# Patient Record
Sex: FEMALE | Employment: FULL TIME | URBAN - NONMETROPOLITAN AREA
[De-identification: names, ages, dates, MRNs, and addresses within clinical notes are randomized per-mention and may not be internally consistent; named-entity substitution may affect disease eponyms.]

---

## 2017-09-30 ENCOUNTER — HOSPITAL ENCOUNTER (EMERGENCY)
Age: 37
Discharge: HOME | End: 2017-10-01
Attending: EMERGENCY MEDICINE
Payer: COMMERCIAL

## 2017-09-30 ENCOUNTER — APPOINTMENT (OUTPATIENT)
Dept: GENERAL RADIOLOGY | Age: 37
End: 2017-09-30
Payer: COMMERCIAL

## 2017-09-30 ENCOUNTER — APPOINTMENT (OUTPATIENT)
Dept: CT IMAGING | Age: 37
End: 2017-09-30
Payer: COMMERCIAL

## 2017-09-30 VITALS
SYSTOLIC BLOOD PRESSURE: 131 MMHG | HEART RATE: 58 BPM | BODY MASS INDEX: 35.82 KG/M2 | WEIGHT: 215 LBS | DIASTOLIC BLOOD PRESSURE: 77 MMHG | HEIGHT: 65 IN | OXYGEN SATURATION: 93 % | RESPIRATION RATE: 12 BRPM | TEMPERATURE: 97.8 F

## 2017-09-30 DIAGNOSIS — G43.001 MIGRAINE WITHOUT AURA AND WITH STATUS MIGRAINOSUS, NOT INTRACTABLE: Primary | ICD-10-CM

## 2017-09-30 LAB
ABSOLUTE EOS #: 0.1 K/UL (ref 0–0.4)
ABSOLUTE LYMPH #: 3 K/UL (ref 1–4.8)
ABSOLUTE MONO #: 0.5 K/UL (ref 0–1)
ANION GAP SERPL CALCULATED.3IONS-SCNC: 14 MMOL/L (ref 9–17)
BASOPHILS # BLD: 1 %
BASOPHILS ABSOLUTE: 0 K/UL (ref 0–0.2)
BNP INTERPRETATION: NORMAL
BUN BLDV-MCNC: 10 MG/DL (ref 6–20)
BUN/CREAT BLD: 12 (ref 9–20)
CALCIUM SERPL-MCNC: 8.9 MG/DL (ref 8.6–10.4)
CHLORIDE BLD-SCNC: 101 MMOL/L (ref 98–107)
CO2: 23 MMOL/L (ref 20–31)
CREAT SERPL-MCNC: 0.82 MG/DL (ref 0.5–0.9)
D-DIMER QUANTITATIVE: 0.85 MG/L FEU (ref 0.19–0.5)
DIFFERENTIAL TYPE: NORMAL
EOSINOPHILS RELATIVE PERCENT: 1 %
GFR AFRICAN AMERICAN: >60 ML/MIN
GFR NON-AFRICAN AMERICAN: >60 ML/MIN
GFR SERPL CREATININE-BSD FRML MDRD: ABNORMAL ML/MIN/{1.73_M2}
GFR SERPL CREATININE-BSD FRML MDRD: ABNORMAL ML/MIN/{1.73_M2}
GLUCOSE BLD-MCNC: 100 MG/DL (ref 70–99)
HCT VFR BLD CALC: 44.7 % (ref 36–46)
HEMOGLOBIN: 14.6 G/DL (ref 12–16)
INR BLD: 1 (ref 0.9–1.2)
LYMPHOCYTES # BLD: 32 %
MCH RBC QN AUTO: 28.4 PG (ref 26–34)
MCHC RBC AUTO-ENTMCNC: 32.7 G/DL (ref 31–37)
MCV RBC AUTO: 86.7 FL (ref 80–100)
MONOCYTES # BLD: 6 %
PARTIAL THROMBOPLASTIN TIME: 27.8 SEC (ref 23.2–34.4)
PDW BLD-RTO: 14.1 % (ref 12.1–15.2)
PLATELET # BLD: 214 K/UL (ref 140–450)
PLATELET ESTIMATE: NORMAL
PMV BLD AUTO: 9.5 FL (ref 6–12)
POTASSIUM SERPL-SCNC: 3.5 MMOL/L (ref 3.7–5.3)
PRO-BNP: 294 PG/ML
PROTHROMBIN TIME: 10.3 SEC (ref 9.7–12.2)
RBC # BLD: 5.16 M/UL (ref 4–5.2)
RBC # BLD: NORMAL 10*6/UL
SEG NEUTROPHILS: 60 %
SEGMENTED NEUTROPHILS ABSOLUTE COUNT: 5.5 K/UL (ref 1.8–7.7)
SODIUM BLD-SCNC: 138 MMOL/L (ref 135–144)
TROPONIN INTERP: NORMAL
TROPONIN T: <0.03 NG/ML
WBC # BLD: 9.1 K/UL (ref 3.5–11)
WBC # BLD: NORMAL 10*3/UL

## 2017-09-30 PROCEDURE — 36415 COLL VENOUS BLD VENIPUNCTURE: CPT

## 2017-09-30 PROCEDURE — 93005 ELECTROCARDIOGRAM TRACING: CPT

## 2017-09-30 PROCEDURE — 6360000002 HC RX W HCPCS: Performed by: EMERGENCY MEDICINE

## 2017-09-30 PROCEDURE — 85025 COMPLETE CBC W/AUTO DIFF WBC: CPT

## 2017-09-30 PROCEDURE — 99285 EMERGENCY DEPT VISIT HI MDM: CPT

## 2017-09-30 PROCEDURE — 85379 FIBRIN DEGRADATION QUANT: CPT

## 2017-09-30 PROCEDURE — 85730 THROMBOPLASTIN TIME PARTIAL: CPT

## 2017-09-30 PROCEDURE — 83880 ASSAY OF NATRIURETIC PEPTIDE: CPT

## 2017-09-30 PROCEDURE — 84484 ASSAY OF TROPONIN QUANT: CPT

## 2017-09-30 PROCEDURE — 71010 XR CHEST PORTABLE: CPT

## 2017-09-30 PROCEDURE — 85610 PROTHROMBIN TIME: CPT

## 2017-09-30 PROCEDURE — 96375 TX/PRO/DX INJ NEW DRUG ADDON: CPT

## 2017-09-30 PROCEDURE — 96374 THER/PROPH/DIAG INJ IV PUSH: CPT

## 2017-09-30 PROCEDURE — 71275 CT ANGIOGRAPHY CHEST: CPT

## 2017-09-30 PROCEDURE — 80048 BASIC METABOLIC PNL TOTAL CA: CPT

## 2017-09-30 RX ORDER — ONDANSETRON 2 MG/ML
4 INJECTION INTRAMUSCULAR; INTRAVENOUS ONCE
Status: COMPLETED | OUTPATIENT
Start: 2017-09-30 | End: 2017-09-30

## 2017-09-30 RX ADMIN — ONDANSETRON 4 MG: 2 INJECTION INTRAMUSCULAR; INTRAVENOUS at 23:07

## 2017-09-30 RX ADMIN — HYDROMORPHONE HYDROCHLORIDE 1 MG: 1 INJECTION, SOLUTION INTRAMUSCULAR; INTRAVENOUS; SUBCUTANEOUS at 23:06

## 2017-09-30 ASSESSMENT — PAIN DESCRIPTION - LOCATION
LOCATION: HEAD
LOCATION_2: CHEST

## 2017-09-30 ASSESSMENT — PAIN SCALES - GENERAL: PAINLEVEL_OUTOF10: 8

## 2017-09-30 ASSESSMENT — PAIN DESCRIPTION - INTENSITY: RATING_2: 9

## 2017-09-30 ASSESSMENT — PAIN DESCRIPTION - DESCRIPTORS: DESCRIPTORS_2: ACHING

## 2017-09-30 ASSESSMENT — PAIN DESCRIPTION - PAIN TYPE: TYPE: ACUTE PAIN

## 2017-09-30 NOTE — ED AVS SNAPSHOT
After Visit Summary  (Discharge Instructions)    Medication List for Home    Based on the information you provided to us as well as any changes during this visit, the following is your updated medication list.  Compare this with your prescription bottles at home. If you have any questions or concerns, contact your primary care physician's office. Daily Medication List (This medication list can be shared with any Healthcare provider who is helping you manage your medications)      ASK your doctor about these medications if you have questions     aspirin 81 MG chewable tablet   Take 81 mg by mouth daily       ibuprofen 200 MG tablet   Commonly known as:  ADVIL;MOTRIN   Take 600 mg by mouth every 6 hours as needed. metoprolol succinate 25 MG extended release tablet   Commonly known as:  TOPROL XL   Take 25 mg by mouth daily               Allergies as of 10/1/2017     No Known Allergies      Immunizations as of 10/1/2017     No immunizations on file. After Visit Summary    This summary was created for you. Thank you for entrusting your care to us. The following information includes details about your hospital/visit stay along with steps you should take to help with your recovery once you leave the hospital.  In this packet, you will find information about the topics listed below:    · Instructions about your medications including a list of your home medications  · A summary of your hospital visit  · Follow-up appointments once you have left the hospital  · Your care plan at home      You may receive a survey regarding the care you received during your stay. Your input is valuable to us. We encourage you to complete and return your survey in the envelope provided. We hope you will choose us in the future for your healthcare needs.           Patient Information     Patient Name TRENTON Liz 1980      Care Provided at:     Name Address Phone [address]  [place] [phone]            Your Visit    Here you will find information about your visit, including the reason for your visit. Please take this sheet with you when you visit your doctor or other health care provider in the future. It will help determine the best possible medical care for you at that time. If you have any questions once you leave the hospital, please call the department phone number listed below. Diagnoses this visit     Your diagnosis was MIGRAINE WITHOUT AURA AND WITH STATUS MIGRAINOSUS, NOT INTRACTABLE. Visit Information     Date of Visit Department Dept Phone    9/30/2017 [facility] [phone]      You were seen by     You were seen by Christine Loja DO. Follow-up Appointments    Below is a list of your follow-up and future appointments. This may not be a complete list as you may have made appointments directly with providers that we are not aware of or your providers may have made some for you. Please call your providers to confirm appointments. It is important to keep your appointments. Please bring your current insurance card, photo ID, co-pay, and all medication bottles to your appointment. If self-pay, payment is expected at the time of service. Follow-up Information     Follow up with ALEXANDER CALVILLO CNP. Specialty:  Nurse Practitioner    Why:  As needed    Contact information:    [address]  [phone]        Preventive Care        Date Due    HIV screening is recommended for all people regardless of risk factors  aged 15-65 years at least once (lifetime) who have never been HIV tested.  11/12/1994    Tetanus Combination Vaccine (1 - Tdap) 11/12/1998    Pneumococcal Vaccine - Pneumovax for adults aged 19-64 years with: chronic heart disease, chronic lung disease, diabetes mellitus, alcoholism, chronic liver disease, or cigarette smoking. (1 of 1 - PPSV23) 11/12/1998 Certain functionality such as prescription refills, scheduling appointments or sending messages to your provider are not activated if your provider does not use [facility] in his/her office    For questions regarding your MyChart account call [phone]. If you have a clinical question, please call your doctor's office. The information on all pages of the After Visit Summary has been reviewed with me, the patient and/or responsible adult, by my health care provider(s). I had the opportunity to ask questions regarding this information. I understand I should dispose of my armband safely at home to protect my health information. A complete copy of the After Visit Summary has been given to me, the patient and/or responsible adult. Patient Signature/Responsible Adult: ___________________________________    Nurse Signature: ___________________________________  Resident/MLP Signature: ___________________________________  Attending Signature: ___________________________________    Date:____________Time:____________              Discharge Instructions       Rest at home tonight. Follow-up as needed with family physician.

## 2017-10-01 PROCEDURE — 71275 CT ANGIOGRAPHY CHEST: CPT

## 2017-10-01 PROCEDURE — 96376 TX/PRO/DX INJ SAME DRUG ADON: CPT

## 2017-10-01 PROCEDURE — 6360000002 HC RX W HCPCS: Performed by: EMERGENCY MEDICINE

## 2017-10-01 PROCEDURE — 6360000004 HC RX CONTRAST MEDICATION: Performed by: EMERGENCY MEDICINE

## 2017-10-01 RX ORDER — ONDANSETRON 2 MG/ML
4 INJECTION INTRAMUSCULAR; INTRAVENOUS ONCE
Status: COMPLETED | OUTPATIENT
Start: 2017-10-01 | End: 2017-10-01

## 2017-10-01 RX ADMIN — ONDANSETRON 4 MG: 2 INJECTION INTRAMUSCULAR; INTRAVENOUS at 00:15

## 2017-10-01 RX ADMIN — IOPAMIDOL 100 ML: 612 INJECTION, SOLUTION INTRAVENOUS at 00:02

## 2017-10-01 ASSESSMENT — PAIN SCALES - GENERAL: PAINLEVEL_OUTOF10: 3

## 2017-10-01 NOTE — ED PROVIDER NOTES
Cigarettes    Smokeless tobacco: Never Used    Alcohol use Yes      Comment: rarely    Drug use: No    Sexual activity: Not on file     Other Topics Concern    Not on file     Social History Narrative       REVIEW OF SYSTEMS    Constitutional:  Denies fever, chills, weight loss or weakness   Eyes:  Denies photophobia or discharge   HENT:  Denies sore throat or ear pain   Respiratory:  Denies cough or shortness of breath   Cardiovascular:  Patient had recent thoracic surgery for repair of thoracic aortic aneurysm. She does note very mild pleuritic chest pain. She denies fever or infection at the incision site. GI:  Denies abdominal pain or diarrhea. Patient does note mild nausea with chief complaint. Musculoskeletal:  Denies back pain   Skin:  Denies rash   Neurologic:  Patient complains of one day history of typical migraine headache. She denies focal weakness or sensory changes. Endocrine:  Denies polyuria or polydypsia   Lymphatic:  Denies swollen glands   Psychiatric:  Denies depression, suicidal ideation or homicidal ideation   All systems negative except as marked. PHYSICAL EXAM    VITAL SIGNS: /77  Pulse 58  Temp 97.8 °F (36.6 °C)  Resp 12  Ht 5' 5\" (1.651 m)  Wt 215 lb (97.5 kg)  LMP 09/30/2017  SpO2 93%  BMI 35.78 kg/m2   Constitutional:  Well developed, Well nourished, uncomfortable appearing, Non-toxic appearance. HENT:  Normocephalic, Atraumatic, Bilateral external ears normal, Oropharynx moist, No oral exudates, Nose normal. Neck- Normal range of motion, No tenderness, Supple, No stridor. Eyes:  PERRL, EOMI, Conjunctiva normal, No discharge. Photophobic  Respiratory:  Normal breath sounds, No respiratory distress, No wheezing, No chest tenderness. Cardiovascular:  Normal heart rate, Normal rhythm, No murmurs, No rubs, No gallops. Sternal incision is healing well. No cellulitis or abscesses noted.   GI:  Bowel sounds normal, Soft, No tenderness, No masses, No caliber without focal aneurysm or dissection. The central pulmonary arteries are patent without  embolism. There are few scattered nonenlarged mediastinal and hilar lymph nodes. There is no significant lymphadenopathy. The trachea and mainstem bronchi are patent. The esophagus is normal in course and caliber. Images through the lung parenchyma demonstrate subtle patchy ground-glass type airspace opacities bilaterally with mild bibasilar pleural thickening. There is no parenchymal mass, effusion, or pneumothorax. Images through the upper abdomen are unremarkable. Mild plaque is seen in the thoracic aorta which is normal in caliber. Mild scoliosis and multilevel degenerative changes are seen in the thoracic spine. Impression 1. No evidence of pulmonary embolus. 2. Patchy ground-glass type airspace opacities bilaterally. Findings are nonspecific and may reflect edema or pneumonitis of unknown etiology. Recommend clinical followup. 3. Mild bibasilar pleural thickening. 4. Mild aortic plaque and multilevel degenerative changes the spine. Electronically Signed By: Geovanny Nguyen   on  10/01/2017  00:32      PROCEDURES          ED COURSE & MEDICAL DECISION MAKING    Pertinent Labs & Imaging studies reviewed. (See chart for details) patient is noted to be hypertensive on ER arrival.  Blood pressure is now 147/77 mmHg. Patient does appear calm and comfortable appearing. BASIC metabolic panel is within normal limits. BNP is within normal limits. CBC with differential shows WBC 9.1, hemoglobin 14.6. Troponin is within normal limits. INR is 1.0. D-dimer was elevated at 0.85. Chest x-ray shows no active disease. CT angiogram of chest is pending at this time. Headache improved significantly with IV pain and nausea medication. Angiogram of chest is negative for pulmonary embolism. Test results were discussed with the patient.               Labs Reviewed   BASIC METABOLIC PANEL - Abnormal; Notable for the following: Result Value    Glucose 100 (*)     Potassium 3.5 (*)     All other components within normal limits   D-DIMER, QUANTITATIVE - Abnormal; Notable for the following:     D-Dimer, Quant 0.85 (*)     All other components within normal limits   APTT   BRAIN NATRIURETIC PEPTIDE   CBC WITH AUTO DIFFERENTIAL   TROPONIN   PROTIME-INR       Labs Reviewed   BASIC METABOLIC PANEL - Abnormal; Notable for the following:        Result Value    Glucose 100 (*)     Potassium 3.5 (*)     All other components within normal limits   D-DIMER, QUANTITATIVE - Abnormal; Notable for the following:     D-Dimer, Quant 0.85 (*)     All other components within normal limits   APTT   BRAIN NATRIURETIC PEPTIDE   CBC WITH AUTO DIFFERENTIAL   TROPONIN   PROTIME-INR       FINAL IMPRESSION    1. Migraine without aura and with status migrainosus, not intractable             Summation      Patient Course:        ED Medications administered this visit:    Medications   HYDROmorphone (DILAUDID) injection 1 mg (1 mg Intravenous Given 9/30/17 2306)   ondansetron (ZOFRAN) injection 4 mg (4 mg Intravenous Given 9/30/17 2307)   iopamidol (ISOVUE-300) 61 % injection 100 mL (100 mLs Intravenous Given 10/1/17 0002)   ondansetron (ZOFRAN) injection 4 mg (4 mg Intravenous Given 10/1/17 0015)       New Prescriptions from this visit:    New Prescriptions    No medications on file       Follow-up:  Dayana Zee, [address]  [phone]      As needed        Final Impression:   1.  Migraine without aura and with status migrainosus, not intractable               (Please note that portions of this note were completed with a voice recognition program.  Efforts were made to edit the dictations but occasionally words are mis-transcribed.)       Obed Marley DO  10/01/17 6553

## 2017-10-03 LAB
EKG ATRIAL RATE: 67 BPM
EKG P AXIS: 48 DEGREES
EKG P-R INTERVAL: 176 MS
EKG Q-T INTERVAL: 440 MS
EKG QRS DURATION: 82 MS
EKG QTC CALCULATION (BAZETT): 464 MS
EKG R AXIS: 33 DEGREES
EKG T AXIS: 61 DEGREES
EKG VENTRICULAR RATE: 67 BPM

## 2017-10-28 ENCOUNTER — HOSPITAL ENCOUNTER (EMERGENCY)
Age: 37
Discharge: HOME | End: 2017-10-28
Attending: EMERGENCY MEDICINE
Payer: COMMERCIAL

## 2017-10-28 VITALS
DIASTOLIC BLOOD PRESSURE: 104 MMHG | RESPIRATION RATE: 18 BRPM | SYSTOLIC BLOOD PRESSURE: 147 MMHG | TEMPERATURE: 98.7 F | HEART RATE: 77 BPM | OXYGEN SATURATION: 96 %

## 2017-10-28 DIAGNOSIS — R51.9 ACUTE NONINTRACTABLE HEADACHE, UNSPECIFIED HEADACHE TYPE: Primary | ICD-10-CM

## 2017-10-28 PROCEDURE — 6360000002 HC RX W HCPCS: Performed by: EMERGENCY MEDICINE

## 2017-10-28 PROCEDURE — 99282 EMERGENCY DEPT VISIT SF MDM: CPT

## 2017-10-28 PROCEDURE — 96375 TX/PRO/DX INJ NEW DRUG ADDON: CPT

## 2017-10-28 PROCEDURE — 96374 THER/PROPH/DIAG INJ IV PUSH: CPT

## 2017-10-28 PROCEDURE — 2580000003 HC RX 258: Performed by: EMERGENCY MEDICINE

## 2017-10-28 RX ORDER — ONDANSETRON 2 MG/ML
4 INJECTION INTRAMUSCULAR; INTRAVENOUS ONCE
Status: COMPLETED | OUTPATIENT
Start: 2017-10-28 | End: 2017-10-28

## 2017-10-28 RX ORDER — METOCLOPRAMIDE HYDROCHLORIDE 5 MG/ML
10 INJECTION INTRAMUSCULAR; INTRAVENOUS ONCE
Status: COMPLETED | OUTPATIENT
Start: 2017-10-28 | End: 2017-10-28

## 2017-10-28 RX ORDER — 0.9 % SODIUM CHLORIDE 0.9 %
500 INTRAVENOUS SOLUTION INTRAVENOUS ONCE
Status: COMPLETED | OUTPATIENT
Start: 2017-10-28 | End: 2017-10-28

## 2017-10-28 RX ORDER — DIPHENHYDRAMINE HYDROCHLORIDE 50 MG/ML
25 INJECTION INTRAMUSCULAR; INTRAVENOUS ONCE
Status: COMPLETED | OUTPATIENT
Start: 2017-10-28 | End: 2017-10-28

## 2017-10-28 RX ADMIN — METOCLOPRAMIDE 10 MG: 5 INJECTION, SOLUTION INTRAMUSCULAR; INTRAVENOUS at 19:07

## 2017-10-28 RX ADMIN — DIPHENHYDRAMINE HYDROCHLORIDE 25 MG: 50 INJECTION, SOLUTION INTRAMUSCULAR; INTRAVENOUS at 19:08

## 2017-10-28 RX ADMIN — ONDANSETRON 4 MG: 2 INJECTION INTRAMUSCULAR; INTRAVENOUS at 19:07

## 2017-10-28 RX ADMIN — SODIUM CHLORIDE 500 ML: 9 INJECTION, SOLUTION INTRAVENOUS at 19:05

## 2017-10-28 ASSESSMENT — PAIN DESCRIPTION - ONSET: ONSET: ON-GOING

## 2017-10-28 ASSESSMENT — PAIN DESCRIPTION - DESCRIPTORS: DESCRIPTORS: HEADACHE

## 2017-10-28 ASSESSMENT — ENCOUNTER SYMPTOMS
GASTROINTESTINAL NEGATIVE: 1
RESPIRATORY NEGATIVE: 1
PHOTOPHOBIA: 1

## 2017-10-28 ASSESSMENT — PAIN DESCRIPTION - LOCATION
LOCATION: HEAD
LOCATION: HEAD

## 2017-10-28 ASSESSMENT — PAIN DESCRIPTION - PAIN TYPE
TYPE: ACUTE PAIN

## 2017-10-28 ASSESSMENT — PAIN - FUNCTIONAL ASSESSMENT: PAIN_FUNCTIONAL_ASSESSMENT: 0-10

## 2017-10-28 ASSESSMENT — PAIN SCALES - GENERAL
PAINLEVEL_OUTOF10: 8
PAINLEVEL_OUTOF10: 1
PAINLEVEL_OUTOF10: 8

## 2017-10-28 ASSESSMENT — PAIN DESCRIPTION - FREQUENCY: FREQUENCY: CONTINUOUS

## 2017-10-28 ASSESSMENT — PAIN DESCRIPTION - PROGRESSION: CLINICAL_PROGRESSION: NOT CHANGED

## 2017-10-28 NOTE — ED NOTES
Dr Tereso Cortez and Dr Carley Ta aware of elevated blood pressure.      [facility], RN  10/28/17 6018

## 2017-10-28 NOTE — ED PROVIDER NOTES
Patient is here for a migraine headache. Her headache is of gradual onset this morning and says it is a typical migraine over the top of her head associate was some photophobia. She is also has some nausea and vomiting which is typical for her headaches. She usually can get them better at home however she has come in the past and got medication though she does not know what it is that we usually give her to make things better. She has no weakness no numbness no fever. She has had no head trauma. And its of very typical pattern for her headaches. Patient denies any recent change in medication            Review of Systems   Constitutional: Negative for chills and fever. HENT: Negative. Eyes: Positive for photophobia. Respiratory: Negative. Cardiovascular: Negative. Gastrointestinal: Negative. Musculoskeletal: Negative. Neurological: Positive for headaches. Negative for dizziness, seizures, speech difficulty and weakness. Psychiatric/Behavioral: Negative. Physical Exam   Constitutional: She is oriented to person, place, and time. She appears well-developed and well-nourished. HENT:   Head: Normocephalic and atraumatic. Eyes: Pupils are equal, round, and reactive to light. Disc are sharp   Neck: Normal range of motion. Neck supple. Cardiovascular: Normal rate and regular rhythm. Pulmonary/Chest: Effort normal and breath sounds normal.   Abdominal: Soft. Bowel sounds are normal.   Musculoskeletal: Normal range of motion. Neurological: She is alert and oriented to person, place, and time. Coordination normal.   Skin: Skin is warm and dry. Psychiatric: She has a normal mood and affect. Her behavior is normal.       Procedures    MDM  Plan will be to treat for headache and repeat BP  She did take he medication. Care turned over to Dr Mirta Patricia at this time. Headache is typical for this patient    ED Course        Labs      Radiology      EKG Interpretation.        Chago MCCORMICK Nila Butler MD  10/28/17 3969

## 2017-10-29 NOTE — ED PROVIDER NOTES
[facility] ED  Emergency Department     Care of Soni Sanchez was assumed from Dr. Barbara Lo. Time of signout is 1900. The patient's initial evaluation and plan have been discussed with the prior provider who initially evaluated the patient . All pertinent data has been reviewed. Patient presents with a migraine headache that began this morning. Headache is diffuse. It is typical of her migraines that she gets 3 times a week. She has been seen by physician for this in the past.  She has had a negative CT of the head. Her headaches usually resolve with Tylenol but did not today. States the headache has been gradually getting worse. No history or family history of subarachnoid hemorrhage, cerebral aneurysms, or tumors. Patient denies fever, chills, visual changes, neck pain, chest pain, lightheadedness, shortness of breath, abdominal pain, nausea, vomiting, diarrhea, or dysuria. On my examination, the patient is resting in bed comfortably and sleeping. She appears nontoxic. She has no focal neurological deficits. General: Obese  HEENT: WNL. No meningismus  Lungs: Clear and equal bilaterally. No increased work of breathing or respiratory distress. Heart: Rate and rhythm regular, no murmurs clicks or gallops  Abdomen: Soft, nondistended, nontender   Lower extremities: no edema, negative Homans bilaterally  Neuro: Awake and alert, no lateralizing deficits, cranial nerves II through XII grossly intact bilaterally    EMERGENCY DEPARTMENT COURSE / MEDICAL DECISION MAKING:       MEDICATIONS GIVEN:  Orders Placed This Encounter   Medications    metoclopramide (REGLAN) injection 10 mg    ondansetron (ZOFRAN) injection 4 mg    diphenhydrAMINE (BENADRYL) injection 25 mg    0.9 % sodium chloride bolus     LABS / RADIOLOGY:     No results found for this visit on 10/28/17.   Xr Chest Portable    Result Date: 9/30/2017  FINAL REPORT EXAM:  XR CHEST PORTABLE HISTORY:  chest pain TECHNIQUE:  AP portable view of the chest PRIORS:  CXR 01/16/2017 FINDINGS:  Lines, tubes, and devices:  Median sternotomy wires are again noted. Lungs and pleura: Trachea is normal in position. Lungs are clear of infiltrate, pleural effusion, vascular congestion, or pneumothorax. No change. Cardiomediastinal silhouette: Heart is mildly enlarged but stable. Other: Bony structures are intact. Impression:  No acute cardiopulmonary process seen. No change. Electronically Signed By: Elba Fonseca   on  09/30/2017  23:01    Cta Chest W Contrast    Result Date: 10/1/2017  FINAL REPORT EXAM:  CTA CHEST WITH CONTRAST HISTORY:  Pleuritic chest pain/elevated d-dimer/recent thoracic aortic aneurysm repair TECHNIQUE:  Sequential axial CT images were obtained from the base of the neck to the upper abdomen after the uneventful administration of 100 cc Isovue-300 intravenous contrast.  MIP images were also obtained for better visualization of the vascular structures. PRIORS:  09/25/2016 FINDINGS:  The patient is status post median sternotomy. Heart size is normal and there is no significant pericardial effusion. The aorta and great vessels are normal in caliber without focal aneurysm or dissection. The central pulmonary arteries are patent without  embolism. There are few scattered nonenlarged mediastinal and hilar lymph nodes. There is no significant lymphadenopathy. The trachea and mainstem bronchi are patent. The esophagus is normal in course and caliber. Images through the lung parenchyma demonstrate subtle patchy ground-glass type airspace opacities bilaterally with mild bibasilar pleural thickening. There is no parenchymal mass, effusion, or pneumothorax. Images through the upper abdomen are unremarkable. Mild plaque is seen in the thoracic aorta which is normal in caliber. Mild scoliosis and multilevel degenerative changes are seen in the thoracic spine. Impression 1. No evidence of pulmonary embolus.  2. Patchy

## 2021-07-13 ENCOUNTER — APPOINTMENT (OUTPATIENT)
Dept: GENERAL RADIOLOGY | Age: 41
End: 2021-07-13
Payer: MEDICARE

## 2021-07-13 ENCOUNTER — HOSPITAL ENCOUNTER (EMERGENCY)
Age: 41
Discharge: HOME | End: 2021-07-14
Attending: EMERGENCY MEDICINE
Payer: MEDICARE

## 2021-07-13 DIAGNOSIS — S39.012A STRAIN OF LUMBAR REGION, INITIAL ENCOUNTER: Primary | ICD-10-CM

## 2021-07-13 DIAGNOSIS — I10 ESSENTIAL HYPERTENSION: ICD-10-CM

## 2021-07-13 PROCEDURE — 6360000002 HC RX W HCPCS: Performed by: EMERGENCY MEDICINE

## 2021-07-13 PROCEDURE — 72100 X-RAY EXAM L-S SPINE 2/3 VWS: CPT

## 2021-07-13 PROCEDURE — 6370000000 HC RX 637 (ALT 250 FOR IP): Performed by: EMERGENCY MEDICINE

## 2021-07-13 PROCEDURE — 99284 EMERGENCY DEPT VISIT MOD MDM: CPT

## 2021-07-13 PROCEDURE — 96372 THER/PROPH/DIAG INJ SC/IM: CPT

## 2021-07-13 RX ORDER — HYDROCODONE BITARTRATE AND ACETAMINOPHEN 5; 325 MG/1; MG/1
1 TABLET ORAL ONCE
Status: COMPLETED | OUTPATIENT
Start: 2021-07-13 | End: 2021-07-13

## 2021-07-13 RX ORDER — TIZANIDINE 4 MG/1
4 TABLET ORAL EVERY 8 HOURS PRN
Qty: 12 TABLET | Refills: 0 | Status: SHIPPED | OUTPATIENT
Start: 2021-07-13

## 2021-07-13 RX ORDER — METOPROLOL SUCCINATE 25 MG/1
25 TABLET, EXTENDED RELEASE ORAL ONCE
Status: COMPLETED | OUTPATIENT
Start: 2021-07-13 | End: 2021-07-13

## 2021-07-13 RX ORDER — ORPHENADRINE CITRATE 30 MG/ML
60 INJECTION INTRAMUSCULAR; INTRAVENOUS ONCE
Status: COMPLETED | OUTPATIENT
Start: 2021-07-13 | End: 2021-07-13

## 2021-07-13 RX ORDER — METOPROLOL SUCCINATE 25 MG/1
25 TABLET, EXTENDED RELEASE ORAL DAILY
Qty: 30 TABLET | Refills: 0 | Status: SHIPPED | OUTPATIENT
Start: 2021-07-13 | End: 2021-11-13 | Stop reason: SDUPTHER

## 2021-07-13 RX ORDER — AMLODIPINE BESYLATE 5 MG/1
5 TABLET ORAL ONCE
Status: COMPLETED | OUTPATIENT
Start: 2021-07-13 | End: 2021-07-13

## 2021-07-13 RX ORDER — KETOROLAC TROMETHAMINE 15 MG/ML
15 INJECTION, SOLUTION INTRAMUSCULAR; INTRAVENOUS ONCE
Status: COMPLETED | OUTPATIENT
Start: 2021-07-13 | End: 2021-07-13

## 2021-07-13 RX ORDER — LIDOCAINE 50 MG/G
1 PATCH TOPICAL DAILY
Qty: 10 PATCH | Refills: 0 | Status: SHIPPED | OUTPATIENT
Start: 2021-07-13 | End: 2021-07-23

## 2021-07-13 RX ADMIN — ORPHENADRINE CITRATE 60 MG: 30 INJECTION INTRAMUSCULAR; INTRAVENOUS at 20:14

## 2021-07-13 RX ADMIN — HYDROCODONE BITARTRATE AND ACETAMINOPHEN 1 TABLET: 5; 325 TABLET ORAL at 22:52

## 2021-07-13 RX ADMIN — AMLODIPINE BESYLATE 5 MG: 5 TABLET ORAL at 22:53

## 2021-07-13 RX ADMIN — KETOROLAC TROMETHAMINE 15 MG: 15 INJECTION, SOLUTION INTRAMUSCULAR; INTRAVENOUS at 20:14

## 2021-07-13 RX ADMIN — METOPROLOL SUCCINATE 25 MG: 25 TABLET, FILM COATED, EXTENDED RELEASE ORAL at 20:37

## 2021-07-13 RX ADMIN — AMLODIPINE BESYLATE 5 MG: 5 TABLET ORAL at 21:44

## 2021-07-13 ASSESSMENT — ENCOUNTER SYMPTOMS
NAUSEA: 0
BACK PAIN: 1
ABDOMINAL PAIN: 0
COLOR CHANGE: 0
WHEEZING: 0
SHORTNESS OF BREATH: 0
VOMITING: 0

## 2021-07-13 ASSESSMENT — PAIN SCALES - GENERAL
PAINLEVEL_OUTOF10: 8
PAINLEVEL_OUTOF10: 3
PAINLEVEL_OUTOF10: 1
PAINLEVEL_OUTOF10: 8

## 2021-07-13 ASSESSMENT — PAIN DESCRIPTION - PAIN TYPE: TYPE: ACUTE PAIN

## 2021-07-13 ASSESSMENT — PAIN DESCRIPTION - LOCATION: LOCATION: BACK

## 2021-07-13 NOTE — ED NOTES
Dr. Anahi Maria made aware of patient elevated B/P and that patient has not been on her B/P medication for quite sometime.       Tamara Grier RN  07/13/21 9438

## 2021-07-14 VITALS
SYSTOLIC BLOOD PRESSURE: 182 MMHG | TEMPERATURE: 98 F | HEART RATE: 78 BPM | DIASTOLIC BLOOD PRESSURE: 91 MMHG | OXYGEN SATURATION: 99 % | RESPIRATION RATE: 20 BRPM

## 2021-07-14 RX ORDER — HYDRALAZINE HYDROCHLORIDE 20 MG/ML
5 INJECTION INTRAMUSCULAR; INTRAVENOUS EVERY 6 HOURS PRN
Status: DISCONTINUED | OUTPATIENT
Start: 2021-07-14 | End: 2021-07-14

## 2021-07-14 NOTE — ED PROVIDER NOTES
[facility] ED  Emergency Department Encounter  EmergencyMedicine Attending     Pt Name:Vaishali Golria  MRN: [id number]  [place] 1980  Date of evaluation: 7/13/21  PCP:  No primary care provider on file. CHIEF COMPLAINT       Chief Complaint   Patient presents with    Back Pain     fell and twisted back few days ago still hurting       HISTORY OF PRESENT ILLNESS  (Location/Symptom, Timing/Onset, Context/Setting, Quality, Duration, Modifying Factors, Severity.)      Candie Patel is a 38 y.o. female who presents with back pain, both midline as well as left-sided paraspinal lumbar spine. Pain is 8 out of 10. Has been taking Tylenol with mild relief. Pain ongoing since 4 days after a fall. Mechanical fall, slipped over a board that was on the ground that she did not see. No head trauma, no loss of consciousness, only complaining of back pain. No numbness or weakness, able to ambulate, no difficulty with ambulation. No urinary or bowel bladder symptoms. No red flags. No blood thinners that she is on. Patient does have a history of hypertension, comes in with significantly elevated blood pressures, says that she has been noncompliant with the blood pressure medications and does not have any refills. Denies any headaches, denies any abdominal pain, denies any chest pain shortness breath or difficulty breathing. Denies any other complaints. PAST MEDICAL / SURGICAL / SOCIAL / FAMILY HISTORY      has a past medical history of GERD (gastroesophageal reflux disease) and Hypertension. has a past surgical history that includes Soft Tissue Biopsy and Cardiac surgery.     Social History     Socioeconomic History    Marital status: Single     Spouse name: Not on file    Number of children: Not on file    Years of education: Not on file    Highest education level: Not on file   Occupational History    Not on file   Tobacco Use    Smoking status: Current Every Day Smoker erythema. Neurological:      Mental Status: She is alert. Comments: Normal sinus sensation of the bilateral lower extremities with normal patellar reflexes bilaterally. Normal neurologic examination. Normal ambulation and gait as well. DIFFERENTIAL  DIAGNOSIS     PLAN (LABS / IMAGING / EKG):  Orders Placed This Encounter   Procedures    XR LUMBAR SPINE (2-3 VIEWS)       MEDICATIONS ORDERED:  Orders Placed This Encounter   Medications    metoprolol succinate (TOPROL XL) extended release tablet 25 mg    orphenadrine (NORFLEX) injection 60 mg    ketorolac (TORADOL) injection 15 mg    tiZANidine (ZANAFLEX) 4 MG tablet     Sig: Take 1 tablet by mouth every 8 hours as needed (Back pain)     Dispense:  12 tablet     Refill:  0    lidocaine (LIDODERM) 5 %     Sig: Place 1 patch onto the skin daily for 10 days 12 hours on, 12 hours off. Dispense:  10 patch     Refill:  0    metoprolol succinate (TOPROL XL) 25 MG extended release tablet     Sig: Take 1 tablet by mouth daily     Dispense:  30 tablet     Refill:  0    amLODIPine (NORVASC) tablet 5 mg    HYDROcodone-acetaminophen (NORCO) 5-325 MG per tablet 1 tablet    amLODIPine (NORVASC) tablet 5 mg    DISCONTD: hydrALAZINE (APRESOLINE) injection 5 mg       DDX: Musculoskeletal pain versus fracture versus sprain versus strain    DIAGNOSTIC RESULTS / EMERGENCY DEPARTMENT COURSE / MDM   :  No results found for this visit on 07/13/21. IMPRESSION: 79-year-old female who presents to the emergency department secondary to back pain, traumatic injury and fall a few days ago. Neurovascular intact, no urinary or bowel bladder symptoms. No red flags. X-ray to look for any fractures. Metoprolol for blood pressure control given that patient has been noncompliant. Norflex and Toradol otherwise for pain control. Will reassess.     RADIOLOGY:    XR LUMBAR SPINE (2-3 VIEWS)    Result Date: 7/13/2021  EXAMINATION: THREE XRAY VIEWS OF THE LUMBAR SPINE REFERRED TO:  Faiza Bingham, APRN - CNP  [address]   [phone]    Call in 1 day        DISCHARGE MEDICATIONS:  Discharge Medication List as of 7/13/2021 10:20 PM      START taking these medications    Details   tiZANidine (ZANAFLEX) 4 MG tablet Take 1 tablet by mouth every 8 hours as needed (Back pain), Disp-12 tablet, R-0Print      lidocaine (LIDODERM) 5 % Place 1 patch onto the skin daily for 10 days 12 hours on, 12 hours off., Disp-10 patch, R-0Print      metoprolol succinate (TOPROL XL) 25 MG extended release tablet Take 1 tablet by mouth daily, Disp-30 tablet, R-0Print             Roxana Humphrey MD  Emergency Medicine Attending    (Please note that portions of thisnote were completed with a voice recognition program.  Efforts were made to edit the dictations but occasionally words are mis-transcribed.)        Roxana Humphrey MD  07/14/21 2560

## 2021-11-13 ENCOUNTER — HOSPITAL ENCOUNTER (EMERGENCY)
Age: 41
Discharge: HOME | End: 2021-11-13
Payer: MEDICARE

## 2021-11-13 ENCOUNTER — APPOINTMENT (OUTPATIENT)
Dept: GENERAL RADIOLOGY | Age: 41
End: 2021-11-13
Payer: MEDICARE

## 2021-11-13 VITALS
SYSTOLIC BLOOD PRESSURE: 178 MMHG | DIASTOLIC BLOOD PRESSURE: 86 MMHG | TEMPERATURE: 98.2 F | OXYGEN SATURATION: 99 % | HEART RATE: 56 BPM | RESPIRATION RATE: 16 BRPM

## 2021-11-13 DIAGNOSIS — U07.1 COVID-19: Primary | ICD-10-CM

## 2021-11-13 LAB
SARS-COV-2, RAPID: DETECTED
SPECIMEN DESCRIPTION: ABNORMAL

## 2021-11-13 PROCEDURE — 99284 EMERGENCY DEPT VISIT MOD MDM: CPT

## 2021-11-13 PROCEDURE — 87635 SARS-COV-2 COVID-19 AMP PRB: CPT

## 2021-11-13 PROCEDURE — 71045 X-RAY EXAM CHEST 1 VIEW: CPT

## 2021-11-13 RX ORDER — METOPROLOL SUCCINATE 25 MG/1
25 TABLET, EXTENDED RELEASE ORAL DAILY
Qty: 30 TABLET | Refills: 0 | Status: SHIPPED | OUTPATIENT
Start: 2021-11-13

## 2021-11-13 RX ORDER — ALBUTEROL SULFATE 90 UG/1
2 AEROSOL, METERED RESPIRATORY (INHALATION) EVERY 6 HOURS PRN
Qty: 18 G | Refills: 0 | Status: SHIPPED | OUTPATIENT
Start: 2021-11-13

## 2021-11-13 RX ORDER — AZITHROMYCIN 250 MG/1
TABLET, FILM COATED ORAL
Qty: 1 PACKET | Refills: 0 | Status: SHIPPED | OUTPATIENT
Start: 2021-11-13 | End: 2021-11-17

## 2021-11-13 RX ORDER — DEXAMETHASONE 6 MG/1
6 TABLET ORAL DAILY
Qty: 7 TABLET | Refills: 0 | Status: SHIPPED | OUTPATIENT
Start: 2021-11-13 | End: 2021-11-20

## 2021-11-13 ASSESSMENT — ENCOUNTER SYMPTOMS: COUGH: 1

## 2021-11-13 NOTE — ED PROVIDER NOTES
[facility] ED  eMERGENCY dEPARTMENT eNCOUnter      Pt Name: Cesar Granados  MRN: [id number]  [place] 1980  Date of evaluation: 11/13/21      CHIEF COMPLAINT       Chief Complaint   Patient presents with    Nasal Congestion     congestion ongoing for four weeks    Headache     concerned about bp         HISTORY OF PRESENT ILLNESS    Cesar Granados is a 42 y.o. female who presents complaining of cough and congestion has been going on for the past few weeks. She is also out of her blood pressure medicine. The history is provided by the patient. URI  Presenting symptoms: congestion and cough    Presenting symptoms: no fever    Severity:  Moderate  Onset quality:  Gradual  Duration:  4 weeks  Timing:  Intermittent  Progression:  Waxing and waning  Chronicity:  New  Relieved by:  Nothing  Worsened by:  Nothing  Ineffective treatments:  None tried      REVIEW OF SYSTEMS       Review of Systems   Constitutional: Negative for fever. HENT: Positive for congestion. Respiratory: Positive for cough. All other systems reviewed and are negative. PAST MEDICAL HISTORY     Past Medical History:   Diagnosis Date    COVID-19 11/13/2021    GERD (gastroesophageal reflux disease)     Hypertension     Thoracic aortic aneurysm [facility])     s/p repair       SURGICAL HISTORY       Past Surgical History:   Procedure Laterality Date    CARDIAC SURGERY      SOFT TISSUE BIOPSY      THORACIC AORTIC ANEURYSM REPAIR         CURRENT MEDICATIONS       Discharge Medication List as of 11/13/2021  6:42 PM      CONTINUE these medications which have NOT CHANGED    Details   Aspirin-Acetaminophen-Caffeine (EXCEDRIN MIGRAINE PO) Take by mouthHistorical Med      tiZANidine (ZANAFLEX) 4 MG tablet Take 1 tablet by mouth every 8 hours as needed (Back pain), Disp-12 tablet, R-0Print      ibuprofen (ADVIL;MOTRIN) 200 MG tablet Take 600 mg by mouth every 6 hours as needed.              ALLERGIES     has No Known Allergies. FAMILY HISTORY     She indicated that her father is . She indicated that the status of her other is unknown. SOCIAL HISTORY      reports that she has been smoking cigarettes. She has a 39.00 pack-year smoking history. She has never used smokeless tobacco. She reports current alcohol use. She reports that she does not use drugs. PHYSICAL EXAM     INITIAL VITALS: BP (!) 178/86   Pulse 56   Temp 98.2 °F (36.8 °C)   Resp 16   LMP 2021   SpO2 99%      Physical Exam  Vitals and nursing note reviewed. Constitutional:       Appearance: She is well-developed. HENT:      Head: Normocephalic and atraumatic. Nose: Nose normal.   Eyes:      Pupils: Pupils are equal, round, and reactive to light. Cardiovascular:      Rate and Rhythm: Normal rate and regular rhythm. Heart sounds: Normal heart sounds. No murmur heard. Pulmonary:      Effort: Pulmonary effort is normal.      Breath sounds: Normal breath sounds. Abdominal:      General: Bowel sounds are normal.      Palpations: Abdomen is soft. Tenderness: There is no abdominal tenderness. Musculoskeletal:         General: Normal range of motion. Cervical back: Normal range of motion. Skin:     General: Skin is warm and dry. Capillary Refill: Capillary refill takes less than 2 seconds. Neurological:      Mental Status: She is alert and oriented to person, place, and time. MEDICAL DECISION MAKING:     Positive Covid. Offered monoclonal antibody patient declined. Prescriptions sent to the pharmacy for Covid and her blood pressure. F/U with pcp as needed for recheck.  Follow cdcd guidelines    DIAGNOSTIC RESULTS     EKG: All EKG's are interpreted by the Emergency Department Physician who either signs or Co-signs this chart in the absence of acardiologist.        RADIOLOGY:Allplain film, CT, MRI, and formal ultrasound images (except ED bedside ultrasound) are read by the radiologist and the images and interpretations are directly viewed by the emergency physician. LABS:All lab results were reviewed by myself, and all abnormals are listed below. Labs Reviewed   COVID-19, RAPID - Abnormal; Notable for the following components:       Result Value    SARS-CoV-2, Rapid DETECTED (*)     All other components within normal limits         EMERGENCY DEPARTMENT COURSE:   Vitals:    Vitals:    11/13/21 1608 11/13/21 1643 11/13/21 1728 11/13/21 1813   BP: (!) 209/90 (!) 146/75 (!) 152/86 (!) 178/86   Pulse:  60 56 56   Resp:  18 20 16   Temp:       SpO2:  95% 97% 99%       The patient was given the following medications while in the emergency department:  Orders Placed This Encounter   Medications    metoprolol succinate (TOPROL XL) 25 MG extended release tablet     Sig: Take 1 tablet by mouth daily     Dispense:  30 tablet     Refill:  0    dexamethasone (DECADRON) 6 MG tablet     Sig: Take 1 tablet by mouth daily for 7 days     Dispense:  7 tablet     Refill:  0    albuterol sulfate HFA (PROAIR HFA) 108 (90 Base) MCG/ACT inhaler     Sig: Inhale 2 puffs into the lungs every 6 hours as needed for Wheezing     Dispense:  18 g     Refill:  0    azithromycin (ZITHROMAX Z-HALLIE) 250 MG tablet     Sig: Take 2 tablets (500 mg) on Day 1, and then take 1 tablet (250 mg) on days 2 through 5.      Dispense:  1 packet     Refill:  0       -------------------------      CRITICAL CARE:    CONSULTS:  None    PROCEDURES:  Procedures    FINAL IMPRESSION      1. COVID-19          DISPOSITION/PLAN   DISPOSITION Decision To Discharge 11/13/2021 06:27:16 PM      PATIENT REFERREDTO:  [address] [facility] [place] [address]  [phone]    Schedule an appointment as soon as possible for a visit   As needed    [facility] ED  [address] [place] [address]  [phone]    If symptoms worsen      DISCHARGEMEDICATIONS:  Discharge Medication List as of 11/13/2021  6:42 PM      START taking these medications    Details   dexamethasone (DECADRON) 6 MG tablet Take 1 tablet by mouth daily for 7 days, Disp-7 tablet, R-0Normal      albuterol sulfate HFA (PROAIR HFA) 108 (90 Base) MCG/ACT inhaler Inhale 2 puffs into the lungs every 6 hours as needed for Wheezing, Disp-18 g, R-0Normal      azithromycin (ZITHROMAX Z-HALLIE) 250 MG tablet Take 2 tablets (500 mg) on Day 1, and then take 1 tablet (250 mg) on days 2 through 5., Disp-1 packet, R-0Normal             (Please note that portions of this note were completed with a voice recognition program.  Efforts were made to edit thedictations but occasionally words are mis-transcribed.)    ROBI Zheng PA-C  12/03/21 1048

## 2021-11-13 NOTE — LETTER
[facility] ED  [address] [place] [address] [place] [address]  Phone: [phone]  Fax: [phone]               November 13, 2021    Patient: Blanca Marie   YOB: 1980   Date of Visit: 11/13/2021       To Whom It May Concern:    Roge Jaffe was seen and treated in our emergency department on 11/13/2021. She may return to work 11/23/2021.       Sincerely,               Signature:__________________________________

## 2021-11-15 ENCOUNTER — CARE COORDINATION (OUTPATIENT)
Dept: CARE COORDINATION | Age: 41
End: 2021-11-15

## 2021-11-16 ENCOUNTER — CARE COORDINATION (OUTPATIENT)
Dept: CARE COORDINATION | Age: 41
End: 2021-11-16

## 2021-11-16 NOTE — CARE COORDINATION
Patient contacted regarding COVID-19 diagnosis. Discussed COVID-19 related testing which was available at this time. Test results were positive. Patient informed of results, if available? Yes. Ambulatory Care Manager contacted the patient by telephone to perform post discharge assessment. Call within 2 business days of discharge: Yes. Verified name and  with patient as identifiers. Provided introduction to self, and explanation of the CTN/ACM role, and reason for call due to risk factors for infection and/or exposure to COVID-19. Symptoms reviewed with patient who verbalized the following symptoms: fatigue, cough, shortness of breath and diarrhea. Due to no new or worsening symptoms encounter was not routed to provider for escalation. Discussed follow-up appointments. If no appointment was previously scheduled, appointment scheduling offered: Patient has appointment already scheduled. [facility] follow up appointment(s): No future appointments. Non-[facility] follow up appointment(s):      Non-face-to-face services provided:       Advance Care Planning:   Does patient have an Advance Directive:  reviewed and current. Educated patient about risk for severe COVID-19 due to risk factors according to CDC guidelines. ACM reviewed discharge instructions, medical action plan and red flag symptoms with the patient who verbalized understanding. Discussed COVID vaccination status: No. Education provided on COVID-19 vaccination as appropriate. Discussed exposure protocols and quarantine with CDC Guidelines. Patient was given an opportunity to verbalize any questions and concerns and agrees to contact ACM or health care provider for questions related to their healthcare. Reviewed and educated patient on any new and changed medications related to discharge diagnosis     Was patient discharged with a pulse oximeter?  No Discussed and confirmed pulse oximeter discharge instructions and when to notify provider or seek emergency care. [facility] provided contact information. No further follow-up call identified based on severity of symptoms and risk factors. Steps to help prevent the spread of COVID-19 if you are sick  SOURCE - [email or web address].info/. html     Stay home except to get medical care   ; Stay home: People who are mildly ill with COVID-19 are able to isolate at home during their illness. You should restrict activities outside your home, except for getting medical care.   ; Avoid public areas: Do not go to work, school, or public areas.   ; Avoid public transportation: Avoid using public transportation, ride-sharing, or taxis.  ; Separate yourself from other people and animals in your home   ; Stay away from others: As much as possible, you should stay in a specific room and away from other people in your home. Also, you should use a separate bathroom, if available.   ; Limit contact with pets & animals: You should restrict contact with pets and other animals while you are sick with COVID-19, just like you would around other people. Although there have not been reports of pets or other animals becoming sick with COVID-19, it is still recommended that people sick with COVID-19 limit contact with animals until more information is known about the virus. ; When possible, have another member of your household care for your animals while you are sick. If you are sick with COVID-19, avoid contact with your pet, including petting, snuggling, being kissed or licked, and sharing food. If you must care for your pet or be around animals while you are sick, wash your hands before and after you interact with pets and wear a facemask. See COVID-19 and Animals for more information. Other considerations   The ill person should eat/be fed in their room if possible. Non-disposable  items used should be handled with gloves and washed with hot water or in a .  Clean hands after handling used  items.  If possible, dedicate a lined trash can for the ill person. Use gloves when removing garbage bags, handling, and disposing of trash. Wash hands after handling or disposing of trash.  Consider consulting with your local health department about trash disposal guidance if available. Information for Household Members and Caregivers of Someone who is Sick   Call ahead before visiting your doctor   Call ahead: If you have a medical appointment, call the healthcare provider and tell them that you have or may have COVID-19. This will help the healthcare provider's office take steps to keep other people from getting infected or exposed. Wear a facemask if you are sick   ; If you are sick: You should wear a facemask when you are around other people (e.g., sharing a room or vehicle) or pets and before you enter a healthcare provider's office. ; If you are caring for others: If the person who is sick is not able to wear a facemask (for example, because it causes trouble breathing), then people who live with the person who is sick should not stay in the same room with them, or they should wear a facemask if they enter a room with the person who is sick. Cover your coughs and sneezes   ; Cover: Cover your mouth and nose with a tissue when you cough or sneeze.   ; Dispose: Throw used tissues in a lined trash can.   ; Wash hands: Immediately wash your hands with soap and water for at least 20 seconds or, if soap and water are not available, clean your hands with an alcohol-based hand  that contains at least 60% alcohol. Clean your hands often   ;  Wash hands: Wash your hands often with soap and water for at least 20 seconds, especially after blowing your nose, coughing, or sneezing; going to the bathroom; and before eating or preparing food.   ; Hand : If soap and water are not readily available, use an alcohol-based hand  with at least 60% alcohol, covering all surfaces of your hands and rubbing them together until they feel dry.   ; Soap and water: Soap and water are the best option if hands are visibly dirty.   ; Avoid touching: Avoid touching your eyes, nose, and mouth with unwashed hands. Handwashing Tips   ; Wet your hands with clean, running water (warm or cold), turn off the tap, and apply soap.  ; Lather your hands by rubbing them together with the soap. Lather the backs of your hands, between your fingers, and under your nails. ; Scrub your hands for at least 20 seconds. Need a timer? Hum the [place] from beginning to end twice.  ; Rinse your hands well under clean, running water.  ; Dry your hands using a clean towel or air dry them. Avoid sharing personal household items   ; Do not share: You should not share dishes, drinking glasses, cups, eating utensils, towels, or bedding with other people or pets in your home.   ; Wash thoroughly after use: After using these items, they should be washed thoroughly with soap and water. Clean all high-touch surfaces everyday   ; Clean and disinfect: Practice routine cleaning of high touch surfaces.  ; High touch surfaces include counters, tabletops, doorknobs, bathroom fixtures, toilets, phones, keyboards, tablets, and bedside tables.  ; Disinfect areas with bodily fluids: Also, clean any surfaces that may have blood, stool, or body fluids on them.   ; Household : Use a household cleaning spray or wipe, according to the label instructions. Labels contain instructions for safe and effective use of the cleaning product including precautions you should take when applying the product, such as wearing gloves and making sure you have good ventilation during use of the product.     Monitor your symptoms   Seek medical attention: Seek prompt medical attention if your illness is worsening     (e.g., difficulty breathing).   ; Call your doctor: Before seeking care, call your healthcare provider and tell them that you have, or are being evaluated for, COVID-19.   ; Wear a facemask when sick: Put on a facemask before you enter the facility. These steps will help the healthcare provider's office to keep other people in the office or waiting room from getting infected or exposed. ; Alert health department: Ask your healthcare provider to call the local or state health department. Persons who are placed under active monitoring or facilitated self-monitoring should follow instructions provided by their local health department or occupational health professionals, as appropriate.  ; Call 911 if you have a medical emergency: If you have a medical emergency and need to call 911, notify the dispatch personnel that you have, or are being evaluated for COVID-19. If possible, put on a facemask before emergency medical services arrive.

## 2021-11-23 ENCOUNTER — APPOINTMENT (OUTPATIENT)
Dept: CT IMAGING | Age: 41
DRG: 137 | End: 2021-11-23
Payer: MEDICARE

## 2021-11-23 ENCOUNTER — APPOINTMENT (OUTPATIENT)
Dept: NON INVASIVE DIAGNOSTICS | Age: 41
DRG: 137 | End: 2021-11-23
Payer: MEDICARE

## 2021-11-23 ENCOUNTER — APPOINTMENT (OUTPATIENT)
Dept: GENERAL RADIOLOGY | Age: 41
DRG: 137 | End: 2021-11-23
Payer: MEDICARE

## 2021-11-23 ENCOUNTER — HOSPITAL ENCOUNTER (INPATIENT)
Age: 41
LOS: 1 days | Discharge: HOME | DRG: 137 | End: 2021-11-24
Attending: EMERGENCY MEDICINE | Admitting: INTERNAL MEDICINE
Payer: MEDICARE

## 2021-11-23 DIAGNOSIS — I50.9 ACUTE CONGESTIVE HEART FAILURE, UNSPECIFIED HEART FAILURE TYPE (HCC): ICD-10-CM

## 2021-11-23 DIAGNOSIS — R60.0 BILATERAL LEG EDEMA: Primary | ICD-10-CM

## 2021-11-23 PROBLEM — Z86.79 HX OF THORACIC AORTIC ANEURYSM REPAIR: Status: ACTIVE | Noted: 2021-11-23

## 2021-11-23 PROBLEM — Z98.890 HX OF THORACIC AORTIC ANEURYSM REPAIR: Status: ACTIVE | Noted: 2021-11-23

## 2021-11-23 PROBLEM — I42.9 CARDIOMYOPATHY (HCC): Status: RESOLVED | Noted: 2021-11-23 | Resolved: 2021-11-23

## 2021-11-23 PROBLEM — I42.9 CARDIOMYOPATHY (HCC): Status: ACTIVE | Noted: 2021-11-23

## 2021-11-23 PROBLEM — U07.1 CARDIOMYOPATHY DUE TO COVID-19 VIRUS (HCC): Status: ACTIVE | Noted: 2021-11-23

## 2021-11-23 PROBLEM — I43 CARDIOMYOPATHY DUE TO COVID-19 VIRUS (HCC): Status: ACTIVE | Noted: 2021-11-23

## 2021-11-23 LAB
-: ABNORMAL
ABSOLUTE EOS #: 0 K/UL (ref 0–0.44)
ABSOLUTE IMMATURE GRANULOCYTE: 0.33 K/UL (ref 0–0.3)
ABSOLUTE LYMPH #: 4.01 K/UL (ref 1.1–3.7)
ABSOLUTE MONO #: 2.51 K/UL (ref 0.1–1.2)
AMORPHOUS: ABNORMAL
ANION GAP SERPL CALCULATED.3IONS-SCNC: 12 MMOL/L (ref 9–17)
ATYPICAL LYMPHOCYTE ABSOLUTE COUNT: 0.33 K/UL
ATYPICAL LYMPHOCYTES: 2 %
BACTERIA: ABNORMAL
BASOPHILS # BLD: 0 % (ref 0–2)
BASOPHILS ABSOLUTE: 0 K/UL (ref 0–0.2)
BILIRUBIN URINE: NEGATIVE
BNP INTERPRETATION: ABNORMAL
BUN BLDV-MCNC: 15 MG/DL (ref 6–20)
BUN/CREAT BLD: 21 (ref 9–20)
CALCIUM SERPL-MCNC: 8.4 MG/DL (ref 8.6–10.4)
CASTS UA: ABNORMAL /LPF
CHLORIDE BLD-SCNC: 100 MMOL/L (ref 98–107)
CO2: 25 MMOL/L (ref 20–31)
COLOR: YELLOW
COMMENT UA: ABNORMAL
CREAT SERPL-MCNC: 0.72 MG/DL (ref 0.5–0.9)
CRYSTALS, UA: ABNORMAL /HPF
DIFFERENTIAL TYPE: ABNORMAL
EKG ATRIAL RATE: 54 BPM
EKG P AXIS: 30 DEGREES
EKG P-R INTERVAL: 138 MS
EKG Q-T INTERVAL: 454 MS
EKG QRS DURATION: 86 MS
EKG QTC CALCULATION (BAZETT): 430 MS
EKG R AXIS: 47 DEGREES
EKG T AXIS: 90 DEGREES
EKG VENTRICULAR RATE: 54 BPM
EOSINOPHILS RELATIVE PERCENT: 0 % (ref 1–4)
EPITHELIAL CELLS UA: ABNORMAL /HPF (ref 0–25)
GFR AFRICAN AMERICAN: >60 ML/MIN
GFR NON-AFRICAN AMERICAN: >60 ML/MIN
GFR SERPL CREATININE-BSD FRML MDRD: ABNORMAL ML/MIN/{1.73_M2}
GFR SERPL CREATININE-BSD FRML MDRD: ABNORMAL ML/MIN/{1.73_M2}
GLUCOSE BLD-MCNC: 99 MG/DL (ref 70–99)
GLUCOSE URINE: NEGATIVE
HCT VFR BLD CALC: 42.1 % (ref 36.3–47.1)
HEMOGLOBIN: 13.8 G/DL (ref 11.9–15.1)
IMMATURE GRANULOCYTES: 2 %
KETONES, URINE: NEGATIVE
LEUKOCYTE ESTERASE, URINE: ABNORMAL
LV EF: 60 %
LVEF MODALITY: NORMAL
LYMPHOCYTES # BLD: 24 % (ref 24–43)
MCH RBC QN AUTO: 28.4 PG (ref 25.2–33.5)
MCHC RBC AUTO-ENTMCNC: 32.8 G/DL (ref 28.4–34.8)
MCV RBC AUTO: 86.6 FL (ref 82.6–102.9)
MONOCYTES # BLD: 15 % (ref 3–12)
MORPHOLOGY: NORMAL
MUCUS: ABNORMAL
NITRITE, URINE: NEGATIVE
NRBC AUTOMATED: 0 PER 100 WBC
OTHER OBSERVATIONS UA: ABNORMAL
PDW BLD-RTO: 13 % (ref 11.8–14.4)
PH UA: 6 (ref 5–9)
PLATELET # BLD: 210 K/UL (ref 138–453)
PLATELET ESTIMATE: ABNORMAL
PMV BLD AUTO: 11 FL (ref 8.1–13.5)
POTASSIUM SERPL-SCNC: 3.1 MMOL/L (ref 3.7–5.3)
PRO-BNP: 1744 PG/ML
PROTEIN UA: NEGATIVE
RBC # BLD: 4.86 M/UL (ref 3.95–5.11)
RBC # BLD: ABNORMAL 10*6/UL
RBC UA: ABNORMAL /HPF (ref 0–2)
RENAL EPITHELIAL, UA: ABNORMAL /HPF
SEG NEUTROPHILS: 57 % (ref 36–65)
SEGMENTED NEUTROPHILS ABSOLUTE COUNT: 9.52 K/UL (ref 1.5–8.1)
SODIUM BLD-SCNC: 137 MMOL/L (ref 135–144)
SPECIFIC GRAVITY UA: 1.01 (ref 1.01–1.02)
THYROXINE, FREE: 1.33 NG/DL (ref 0.93–1.7)
TRICHOMONAS: ABNORMAL
TROPONIN INTERP: ABNORMAL
TROPONIN INTERP: ABNORMAL
TROPONIN T: ABNORMAL NG/ML
TROPONIN T: ABNORMAL NG/ML
TROPONIN, HIGH SENSITIVITY: 23 NG/L (ref 0–14)
TROPONIN, HIGH SENSITIVITY: 25 NG/L (ref 0–14)
TSH SERPL DL<=0.05 MIU/L-ACNC: 3.25 MIU/L (ref 0.3–5)
TURBIDITY: CLEAR
URINE HGB: NEGATIVE
UROBILINOGEN, URINE: NORMAL
WBC # BLD: 16.7 K/UL (ref 3.5–11.3)
WBC # BLD: ABNORMAL 10*3/UL
WBC UA: ABNORMAL /HPF (ref 0–5)
YEAST: ABNORMAL

## 2021-11-23 PROCEDURE — 1200000000 HC SEMI PRIVATE

## 2021-11-23 PROCEDURE — 2580000003 HC RX 258: Performed by: INTERNAL MEDICINE

## 2021-11-23 PROCEDURE — 81001 URINALYSIS AUTO W/SCOPE: CPT

## 2021-11-23 PROCEDURE — 71275 CT ANGIOGRAPHY CHEST: CPT

## 2021-11-23 PROCEDURE — 94761 N-INVAS EAR/PLS OXIMETRY MLT: CPT

## 2021-11-23 PROCEDURE — 99285 EMERGENCY DEPT VISIT HI MDM: CPT

## 2021-11-23 PROCEDURE — 6360000002 HC RX W HCPCS: Performed by: EMERGENCY MEDICINE

## 2021-11-23 PROCEDURE — 6360000004 HC RX CONTRAST MEDICATION: Performed by: EMERGENCY MEDICINE

## 2021-11-23 PROCEDURE — 84484 ASSAY OF TROPONIN QUANT: CPT

## 2021-11-23 PROCEDURE — 80048 BASIC METABOLIC PNL TOTAL CA: CPT

## 2021-11-23 PROCEDURE — 84443 ASSAY THYROID STIM HORMONE: CPT

## 2021-11-23 PROCEDURE — 93306 TTE W/DOPPLER COMPLETE: CPT

## 2021-11-23 PROCEDURE — 93010 ELECTROCARDIOGRAM REPORT: CPT | Performed by: INTERNAL MEDICINE

## 2021-11-23 PROCEDURE — 6360000002 HC RX W HCPCS: Performed by: INTERNAL MEDICINE

## 2021-11-23 PROCEDURE — 71045 X-RAY EXAM CHEST 1 VIEW: CPT

## 2021-11-23 PROCEDURE — 84439 ASSAY OF FREE THYROXINE: CPT

## 2021-11-23 PROCEDURE — 83880 ASSAY OF NATRIURETIC PEPTIDE: CPT

## 2021-11-23 PROCEDURE — 93005 ELECTROCARDIOGRAM TRACING: CPT | Performed by: EMERGENCY MEDICINE

## 2021-11-23 PROCEDURE — 6370000000 HC RX 637 (ALT 250 FOR IP): Performed by: EMERGENCY MEDICINE

## 2021-11-23 PROCEDURE — 85025 COMPLETE CBC W/AUTO DIFF WBC: CPT

## 2021-11-23 RX ORDER — ACETAMINOPHEN 650 MG/1
650 SUPPOSITORY RECTAL EVERY 6 HOURS PRN
Status: DISCONTINUED | OUTPATIENT
Start: 2021-11-23 | End: 2021-11-24 | Stop reason: HOSPADM

## 2021-11-23 RX ORDER — METOPROLOL SUCCINATE 25 MG/1
25 TABLET, EXTENDED RELEASE ORAL DAILY
Status: DISCONTINUED | OUTPATIENT
Start: 2021-11-24 | End: 2021-11-24 | Stop reason: HOSPADM

## 2021-11-23 RX ORDER — ACETAMINOPHEN 325 MG/1
650 TABLET ORAL EVERY 6 HOURS PRN
Status: DISCONTINUED | OUTPATIENT
Start: 2021-11-23 | End: 2021-11-24 | Stop reason: HOSPADM

## 2021-11-23 RX ORDER — ASPIRIN 81 MG/1
324 TABLET, CHEWABLE ORAL ONCE
Status: COMPLETED | OUTPATIENT
Start: 2021-11-23 | End: 2021-11-23

## 2021-11-23 RX ORDER — POLYETHYLENE GLYCOL 3350 17 G/17G
17 POWDER, FOR SOLUTION ORAL DAILY PRN
Status: DISCONTINUED | OUTPATIENT
Start: 2021-11-23 | End: 2021-11-24 | Stop reason: HOSPADM

## 2021-11-23 RX ORDER — ONDANSETRON 2 MG/ML
4 INJECTION INTRAMUSCULAR; INTRAVENOUS EVERY 6 HOURS PRN
Status: DISCONTINUED | OUTPATIENT
Start: 2021-11-23 | End: 2021-11-24 | Stop reason: HOSPADM

## 2021-11-23 RX ORDER — ONDANSETRON 4 MG/1
4 TABLET, ORALLY DISINTEGRATING ORAL EVERY 8 HOURS PRN
Status: DISCONTINUED | OUTPATIENT
Start: 2021-11-23 | End: 2021-11-24 | Stop reason: HOSPADM

## 2021-11-23 RX ORDER — FUROSEMIDE 10 MG/ML
40 INJECTION INTRAMUSCULAR; INTRAVENOUS ONCE
Status: COMPLETED | OUTPATIENT
Start: 2021-11-23 | End: 2021-11-23

## 2021-11-23 RX ORDER — SODIUM CHLORIDE 0.9 % (FLUSH) 0.9 %
5-40 SYRINGE (ML) INJECTION PRN
Status: DISCONTINUED | OUTPATIENT
Start: 2021-11-23 | End: 2021-11-24 | Stop reason: HOSPADM

## 2021-11-23 RX ORDER — FUROSEMIDE 10 MG/ML
40 INJECTION INTRAMUSCULAR; INTRAVENOUS 2 TIMES DAILY
Status: DISCONTINUED | OUTPATIENT
Start: 2021-11-23 | End: 2021-11-24 | Stop reason: HOSPADM

## 2021-11-23 RX ORDER — SODIUM CHLORIDE 0.9 % (FLUSH) 0.9 %
5-40 SYRINGE (ML) INJECTION EVERY 12 HOURS SCHEDULED
Status: DISCONTINUED | OUTPATIENT
Start: 2021-11-23 | End: 2021-11-24 | Stop reason: HOSPADM

## 2021-11-23 RX ORDER — SODIUM CHLORIDE 9 MG/ML
25 INJECTION, SOLUTION INTRAVENOUS PRN
Status: DISCONTINUED | OUTPATIENT
Start: 2021-11-23 | End: 2021-11-24 | Stop reason: HOSPADM

## 2021-11-23 RX ADMIN — ASPIRIN 324 MG: 81 TABLET, CHEWABLE ORAL at 11:27

## 2021-11-23 RX ADMIN — ENOXAPARIN SODIUM 40 MG: 100 INJECTION SUBCUTANEOUS at 16:22

## 2021-11-23 RX ADMIN — FUROSEMIDE 40 MG: 10 INJECTION, SOLUTION INTRAMUSCULAR; INTRAVENOUS at 14:43

## 2021-11-23 RX ADMIN — IOPAMIDOL 75 ML: 755 INJECTION, SOLUTION INTRAVENOUS at 12:19

## 2021-11-23 RX ADMIN — SODIUM CHLORIDE, PRESERVATIVE FREE 10 ML: 5 INJECTION INTRAVENOUS at 20:40

## 2021-11-23 ASSESSMENT — ENCOUNTER SYMPTOMS
RHINORRHEA: 0
BACK PAIN: 0
SHORTNESS OF BREATH: 1
COUGH: 1
DIARRHEA: 0
NAUSEA: 0
VOMITING: 0
ABDOMINAL PAIN: 0
SORE THROAT: 0

## 2021-11-23 ASSESSMENT — PAIN SCALES - GENERAL
PAINLEVEL_OUTOF10: 5
PAINLEVEL_OUTOF10: 0

## 2021-11-23 ASSESSMENT — PAIN DESCRIPTION - PAIN TYPE: TYPE: ACUTE PAIN

## 2021-11-23 NOTE — PROGRESS NOTES
Patient arrived to floor at this time via hospital staff and bed. Patient is alert and oriented and from home. Patient is on room air. Disaster charting initiated and disaster admission complete. Orthostatic blood pressures completed and negative. Patient arrived to floor with phone and purse with other clothing for personal belongings. Assessment negative. Patient is edentulous. Patient denied having needs. Writer spoke with patient family on phone about patient condition and updated them. Call light within reach. Will continue to monitor.

## 2021-11-23 NOTE — ED PROVIDER NOTES
[facility] [place] ED  EMERGENCY DEPARTMENT ENCOUNTER    Pt Name: Hosea Pennington  MRN: [id number]  [id number]  Date of evaluation: 11/23/2021      CHIEF COMPLAINT       Chief Complaint   Patient presents with    Leg Swelling     leg swelling right worse than left    Positive For Covid-19     positive on 11/13         HISTORY OF PRESENT ILLNESS    Hosea Pennington is a 42 y.o. female who presents with symmetrical leg swelling began today. Patient was recently diagnosed with Covid on 1013 had been sick for at least a week prior to that. Overall shortness of breath has improved still has mild cough. No fevers. States she is never had any leg swelling before. Mild chest heaviness but no pain. No back pain. No history of DVT PE no risk factors. No history of renal disease. Does state that she had a history of valve replacement in the [facility] several years ago but has had no issues since and has not required any follow-up. REVIEW OF SYSTEMS       Review of Systems   Constitutional: Negative for chills and fever. HENT: Negative for rhinorrhea and sore throat. Eyes: Negative for visual disturbance. Respiratory: Positive for cough and shortness of breath. Cardiovascular: Positive for chest pain and leg swelling. Gastrointestinal: Negative for abdominal pain, diarrhea, nausea and vomiting. Genitourinary: Negative for decreased urine volume and difficulty urinating. Musculoskeletal: Negative for back pain and neck pain. Skin: Negative for rash. Neurological: Negative for headaches. PAST MEDICAL HISTORY    has a past medical history of GERD (gastroesophageal reflux disease) and Hypertension. SURGICAL HISTORY      has a past surgical history that includes Soft Tissue Biopsy and Cardiac surgery.     CURRENT MEDICATIONS       Previous Medications    ALBUTEROL SULFATE HFA (PROAIR HFA) 108 (90 BASE) MCG/ACT INHALER    Inhale 2 puffs into the lungs every 6 hours as needed for Wheezing    ASPIRIN-ACETAMINOPHEN-CAFFEINE (EXCEDRIN MIGRAINE PO)    Take by mouth    IBUPROFEN (ADVIL;MOTRIN) 200 MG TABLET    Take 600 mg by mouth every 6 hours as needed. METOPROLOL SUCCINATE (TOPROL XL) 25 MG EXTENDED RELEASE TABLET    Take 1 tablet by mouth daily    TIZANIDINE (ZANAFLEX) 4 MG TABLET    Take 1 tablet by mouth every 8 hours as needed (Back pain)       ALLERGIES     has No Known Allergies. FAMILY HISTORY     She indicated that her father is . She indicated that the status of her other is unknown.     family history includes Cancer in an other family member; Diabetes in an other family member; Heart Disease in her father; High Blood Pressure in an other family member. SOCIAL HISTORY      reports that she has been smoking cigarettes. She has been smoking about 1.50 packs per day. She has never used smokeless tobacco. She reports current alcohol use. She reports that she does not use drugs. PHYSICAL EXAM     INITIAL VITALS:  height is 5' 5\" (1.651 m) and weight is 210 lb (95.3 kg). Her tympanic temperature is 98.3 °F (36.8 °C). Her blood pressure is 147/75 (abnormal) and her pulse is 67. Her respiration is 19 and oxygen saturation is 100%. Physical Exam  Constitutional:       General: She is not in acute distress. HENT:      Head: Normocephalic and atraumatic. Eyes:      General: No scleral icterus. Pupils: Pupils are equal, round, and reactive to light. Neck:      Comments: No JVD  Cardiovascular:      Rate and Rhythm: Normal rate and regular rhythm. Heart sounds: Normal heart sounds. No murmur heard. No friction rub. No gallop. Pulmonary:      Effort: Pulmonary effort is normal. No respiratory distress. Breath sounds: Normal breath sounds. No rhonchi or rales. Abdominal:      General: There is no distension. Palpations: Abdomen is soft. Tenderness: There is no abdominal tenderness.  There is no guarding or rebound. Musculoskeletal:         General: Normal range of motion. Cervical back: Normal range of motion and neck supple. Comments: 2+ pitting edema bilaterally no asymmetry no focal calf tenderness   Skin:     General: Skin is warm and dry. Neurological:      Mental Status: She is alert and oriented to person, place, and time. Gait: Gait normal.         DIFFERENTIAL DIAGNOSIS/ MDM:     Symmetrical edema, primary concern for renal, cardiac. No risk factors for DVT PE.  PERC negative. Will order labs, urine, chest x-ray, EKG, reevaluate    Old record review in Care Everywhere, records from the [facility] show:    S/p supra-coronary ascending aorta replacement (30-mm Gelweave graft) via median sternotomy with Dr. Kelsey Parker on 9/2/16. Hx of bicuspid aortic valve with mild AI, aortic root 4.7cm on CTA 9/7/16 (similar to pre-operative imaging although root described as normal sized in operative report),        DIAGNOSTIC RESULTS     EKG: All EKG's are interpreted by the Emergency Department Physician who either signs or Co-signs this chart in the absenceof a cardiologist.    Sinus rhythm, 54, normal intervals, normal axis, no acute ST or T changes, overall flat T waves throughout. RADIOLOGY:   I directly visualized the following  images and reviewed theradiologist interpretations:     CTA CHEST W CONTRAST   Final Result   1. No acute thoracic aortic abnormality identified. Unchanged appearance of   ascending aortic repair. 2.  No evidence for acute pulmonary embolism. 3.  No acute airspace disease identified. XR CHEST PORTABLE   Final Result   Stable chest without acute process.                  ED BEDSIDE ULTRASOUND:   none    LABS:  Labs Reviewed   BASIC METABOLIC PANEL - Abnormal; Notable for the following components:       Result Value    Bun/Cre Ratio 21 (*)     Calcium 8.4 (*)     Potassium 3.1 (*)     All other components within normal limits   BRAIN NATRIURETIC PEPTIDE - Abnormal; Notable for the following components:    Pro-BNP 1,744 (*)     All other components within normal limits   TROPONIN - Abnormal; Notable for the following components:    Troponin, High Sensitivity 25 (*)     All other components within normal limits   CBC WITH AUTO DIFFERENTIAL - Abnormal; Notable for the following components:    WBC 16.7 (*)     Monocytes 15 (*)     Eosinophils % 0 (*)     Immature Granulocytes 2 (*)     Segs Absolute 9.52 (*)     Absolute Lymph # 4.01 (*)     Absolute Mono # 2.51 (*)     Absolute Immature Granulocyte 0.33 (*)     All other components within normal limits   URINALYSIS WITH MICROSCOPIC - Abnormal; Notable for the following components:    Leukocyte Esterase, Urine SMALL (*)     All other components within normal limits   TROPONIN - Abnormal; Notable for the following components:    Troponin, High Sensitivity 23 (*)     All other components within normal limits         EMERGENCY DEPARTMENT COURSE:   Vitals:    Vitals:    11/23/21 1124 11/23/21 1132 11/23/21 1149 11/23/21 1159   BP: (!) 141/76 (!) 148/76  (!) 147/75   Pulse:  63 58 67   Resp:  18 17 19   Temp:       TempSrc:       SpO2:       Weight:       Height:         -------------------------  BP: (!) 147/75, Temp: 98.3 °F (36.8 °C), Pulse: 67, Resp: 19    11:21 AM EST  Given elevated troponin BNP no old for comparison for the troponin history of aortic aneurysm repair, will order CTA of the chest.    1:15 PM EST  Patient updated with unchanged thoracic aneurysm on CT no leak. Therefore do not feel she needs to be transferred to clean clinic. I spoke with Dr. 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 Asked for hospitalist admission he will consult. Will give Lasix    1:26 PM EST  Spoke with Dr. Yancy Daniel, hospitalist on-call, accepted patient to his service, no additional orders.     CRITICAL CARE:     none    CONSULTS:  None    PROCEDURES:  None    FINAL IMPRESSION 1. Bilateral leg edema    2. Acute congestive heart failure, unspecified heart failure type ([facility] [address]          DISPOSITION/PLAN       PATIENT REFERRED TO:  No follow-up provider specified. DISCHARGE MEDICATIONS:  New Prescriptions    No medications on file       (Please note that portions of this note were completed with a voice recognition program.  Efforts were made to edit the dictations butoccasionally words are mis-transcribed. )    Yudy Miller MD  Attending Emergency Physician                   Yudy Miller MD  11/23/21 8716

## 2021-11-23 NOTE — PROGRESS NOTES
Writer spoke with Dr. Maura Taylor. He state that himself or Dr. Ricardo Noonan will see the patient tomorrow.

## 2021-11-24 VITALS
SYSTOLIC BLOOD PRESSURE: 102 MMHG | HEART RATE: 64 BPM | BODY MASS INDEX: 37.17 KG/M2 | WEIGHT: 223.1 LBS | OXYGEN SATURATION: 98 % | TEMPERATURE: 97 F | RESPIRATION RATE: 18 BRPM | HEIGHT: 65 IN | DIASTOLIC BLOOD PRESSURE: 73 MMHG

## 2021-11-24 LAB
ABSOLUTE EOS #: 0.03 K/UL (ref 0–0.44)
ABSOLUTE IMMATURE GRANULOCYTE: 0.42 K/UL (ref 0–0.3)
ABSOLUTE LYMPH #: 3.93 K/UL (ref 1.1–3.7)
ABSOLUTE MONO #: 1.14 K/UL (ref 0.1–1.2)
ANION GAP SERPL CALCULATED.3IONS-SCNC: 9 MMOL/L (ref 9–17)
BASOPHILS # BLD: 0 % (ref 0–2)
BASOPHILS ABSOLUTE: 0.05 K/UL (ref 0–0.2)
BUN BLDV-MCNC: 16 MG/DL (ref 6–20)
BUN/CREAT BLD: 21 (ref 9–20)
CALCIUM SERPL-MCNC: 7.9 MG/DL (ref 8.6–10.4)
CHLORIDE BLD-SCNC: 98 MMOL/L (ref 98–107)
CO2: 30 MMOL/L (ref 20–31)
CREAT SERPL-MCNC: 0.77 MG/DL (ref 0.5–0.9)
DIFFERENTIAL TYPE: ABNORMAL
EOSINOPHILS RELATIVE PERCENT: 0 % (ref 1–4)
GFR AFRICAN AMERICAN: >60 ML/MIN
GFR NON-AFRICAN AMERICAN: >60 ML/MIN
GFR SERPL CREATININE-BSD FRML MDRD: ABNORMAL ML/MIN/{1.73_M2}
GFR SERPL CREATININE-BSD FRML MDRD: ABNORMAL ML/MIN/{1.73_M2}
GLUCOSE BLD-MCNC: 97 MG/DL (ref 70–99)
HCT VFR BLD CALC: 39 % (ref 36.3–47.1)
HEMOGLOBIN: 12.8 G/DL (ref 11.9–15.1)
IMMATURE GRANULOCYTES: 3 %
LYMPHOCYTES # BLD: 30 % (ref 24–43)
MAGNESIUM: 2.1 MG/DL (ref 1.6–2.6)
MCH RBC QN AUTO: 28.5 PG (ref 25.2–33.5)
MCHC RBC AUTO-ENTMCNC: 32.8 G/DL (ref 28.4–34.8)
MCV RBC AUTO: 86.9 FL (ref 82.6–102.9)
MONOCYTES # BLD: 9 % (ref 3–12)
NRBC AUTOMATED: 0 PER 100 WBC
PDW BLD-RTO: 13.2 % (ref 11.8–14.4)
PLATELET # BLD: 181 K/UL (ref 138–453)
PLATELET ESTIMATE: ABNORMAL
PMV BLD AUTO: 11 FL (ref 8.1–13.5)
POTASSIUM SERPL-SCNC: 3.1 MMOL/L (ref 3.7–5.3)
RBC # BLD: 4.49 M/UL (ref 3.95–5.11)
RBC # BLD: ABNORMAL 10*6/UL
SEG NEUTROPHILS: 58 % (ref 36–65)
SEGMENTED NEUTROPHILS ABSOLUTE COUNT: 7.74 K/UL (ref 1.5–8.1)
SODIUM BLD-SCNC: 137 MMOL/L (ref 135–144)
TROPONIN INTERP: ABNORMAL
TROPONIN T: ABNORMAL NG/ML
TROPONIN, HIGH SENSITIVITY: 23 NG/L (ref 0–14)
WBC # BLD: 13.3 K/UL (ref 3.5–11.3)
WBC # BLD: ABNORMAL 10*3/UL

## 2021-11-24 PROCEDURE — 6370000000 HC RX 637 (ALT 250 FOR IP): Performed by: NURSE PRACTITIONER

## 2021-11-24 PROCEDURE — 36415 COLL VENOUS BLD VENIPUNCTURE: CPT

## 2021-11-24 PROCEDURE — 80048 BASIC METABOLIC PNL TOTAL CA: CPT

## 2021-11-24 PROCEDURE — 94761 N-INVAS EAR/PLS OXIMETRY MLT: CPT

## 2021-11-24 PROCEDURE — 85025 COMPLETE CBC W/AUTO DIFF WBC: CPT

## 2021-11-24 PROCEDURE — 99221 1ST HOSP IP/OBS SF/LOW 40: CPT | Performed by: FAMILY MEDICINE

## 2021-11-24 PROCEDURE — 83735 ASSAY OF MAGNESIUM: CPT

## 2021-11-24 PROCEDURE — 2580000003 HC RX 258: Performed by: INTERNAL MEDICINE

## 2021-11-24 PROCEDURE — 84484 ASSAY OF TROPONIN QUANT: CPT

## 2021-11-24 PROCEDURE — 6360000002 HC RX W HCPCS: Performed by: INTERNAL MEDICINE

## 2021-11-24 PROCEDURE — 6370000000 HC RX 637 (ALT 250 FOR IP): Performed by: INTERNAL MEDICINE

## 2021-11-24 RX ORDER — POTASSIUM CHLORIDE 20 MEQ/1
40 TABLET, EXTENDED RELEASE ORAL
Status: DISCONTINUED | OUTPATIENT
Start: 2021-11-24 | End: 2021-11-24 | Stop reason: HOSPADM

## 2021-11-24 RX ADMIN — METOPROLOL SUCCINATE 25 MG: 25 TABLET, EXTENDED RELEASE ORAL at 08:43

## 2021-11-24 RX ADMIN — SODIUM CHLORIDE, PRESERVATIVE FREE 10 ML: 5 INJECTION INTRAVENOUS at 08:45

## 2021-11-24 RX ADMIN — ENOXAPARIN SODIUM 40 MG: 100 INJECTION SUBCUTANEOUS at 08:44

## 2021-11-24 RX ADMIN — POTASSIUM CHLORIDE 40 MEQ: 1500 TABLET, EXTENDED RELEASE ORAL at 10:11

## 2021-11-24 RX ADMIN — FUROSEMIDE 40 MG: 10 INJECTION, SOLUTION INTRAMUSCULAR; INTRAVENOUS at 08:44

## 2021-11-24 ASSESSMENT — PAIN SCALES - GENERAL
PAINLEVEL_OUTOF10: 0
PAINLEVEL_OUTOF10: 0

## 2021-11-24 ASSESSMENT — PAIN DESCRIPTION - PAIN TYPE
TYPE: ACUTE PAIN
TYPE: ACUTE PAIN

## 2021-11-24 NOTE — PLAN OF CARE
Problem: Falls - Risk of:  Goal: Will remain free from falls  Description: Will remain free from falls  Outcome: Completed  Goal: Absence of physical injury  Description: Absence of physical injury  Outcome: Completed     Problem: Airway Clearance - Ineffective  Goal: Achieve or maintain patent airway  Outcome: Completed     Problem: Gas Exchange - Impaired  Goal: Absence of hypoxia  Outcome: Completed  Goal: Promote optimal lung function  Outcome: Completed     Problem: Breathing Pattern - Ineffective  Goal: Ability to achieve and maintain a regular respiratory rate  Outcome: Completed     Problem:  Body Temperature -  Risk of, Imbalanced  Goal: Ability to maintain a body temperature within defined limits  Outcome: Completed  Goal: Will regain or maintain usual level of consciousness  Outcome: Completed  Goal: Complications related to the disease process, condition or treatment will be avoided or minimized  Outcome: Completed     Problem: Isolation Precautions - Risk of Spread of Infection  Goal: Prevent transmission of infection  Outcome: Completed     Problem: Nutrition Deficits  Goal: Optimize nutritional status  Outcome: Completed     Problem: Risk for Fluid Volume Deficit  Goal: Maintain normal heart rhythm  Outcome: Completed  Goal: Maintain absence of muscle cramping  Outcome: Completed  Goal: Maintain normal serum potassium, sodium, calcium, phosphorus, and pH  Outcome: Completed     Problem: Loneliness or Risk for Loneliness  Goal: Demonstrate positive use of time alone when socialization is not possible  Outcome: Completed     Problem: Fatigue  Goal: Verbalize increase energy and improved vitality  Outcome: Completed     Problem: Patient Education: Go to Patient Education Activity  Goal: Patient/Family Education  Outcome: Completed     Problem: Nutrition  Goal: Optimal nutrition therapy  11/24/2021 1757 by Rosalind Cervantes RN  Outcome: Completed  11/24/2021 0809 by Bev Parkeh RD, LD  Outcome: Ongoing  Note: Nutrition Problem #1: Food & Nutrition-related knowledge deficit  Intervention: Food and/or Nutrient Delivery: Continue Current Diet  Nutritional Goals: PO > meals with lower sodium food choices. Low sodium education provided.

## 2021-11-24 NOTE — PROGRESS NOTES
Comprehensive Nutrition Assessment    Type and Reason for Visit:  Initial (missing nutrition screenings)    Nutrition Recommendations/Plan:  Low sodium diet education    Nutrition Assessment:  Food and nutrition related knowledge deficit r/t cardiac dysfunction, AEB new CHF and salt use pta. Weight up from usual values with edema/chf. Has some loss of taste and smell with covid, but states eating well. I discussed lower sodium with her over phone and attached educational materials to d/c instructions as well. At risk for vit D deficiency r/t obesity, not currently on supplemental D, nor vit C or zinc.    Malnutrition Assessment:  Malnutrition Status: At risk for malnutrition (Comment)    Context:  Acute Illness     Findings of the 6 clinical characteristics of malnutrition:  Energy Intake:  No significant decrease in energy intake  Weight Loss:  No significant weight loss     Body Fat Loss:  Unable to assess     Muscle Mass Loss:  Unable to assess    Fluid Accumulation:  1 - Mild Extremities   Strength:  Not Performed    Estimated Daily Nutrient Needs:  Energy (kcal):  1001-5835 (15-18); Weight Used for Energy Requirements:  Current     Protein (g):  68-80 (1.2-1.4); Weight Used for Protein Requirements:  Ideal        Fluid (ml/day):  1900; Method Used for Fluid Requirements:  1 ml/kcal      Nutrition Related Findings:  + b/s, trace BLE edema      Wounds:  None       Current Nutrition Therapies:    ADULT DIET; Regular;  Low Sodium (2 gm)    Anthropometric Measures:  · Height: 5' 5\" (165.1 cm)  · Current Body Weight: 226 lb 3.2 oz (102.6 kg)   · Admission Body Weight: 210 lb (95.3 kg)    · Usual Body Weight: 210 lb (95.3 kg)     · Ideal Body Weight: 125 lbs; % Ideal Body Weight 181 %   · BMI: 37.6  · Adjusted Body Weight:  ; No Adjustment   · BMI Categories: Obese Class 2 (BMI 35.0 -39.9)       Nutrition Diagnosis:   · Food & Nutrition-related knowledge deficit related to cardiac dysfunction as evidenced by other (comment) (home diet)    Lab Results   Component Value Date     11/24/2021    K 3.1 (L) 11/24/2021    CL 98 11/24/2021    CO2 30 11/24/2021    BUN 16 11/24/2021    CREATININE 0.77 11/24/2021    GLUCOSE 97 11/24/2021    CALCIUM 7.9 (L) 11/24/2021    PROT 6.6 12/01/2015    LABALBU 4.0 12/01/2015    BILITOT 0.40 12/01/2015    ALKPHOS 103 12/01/2015    AST 11 12/01/2015    ALT 10 12/01/2015    LABGLOM >60 11/24/2021    GFRAA >60 11/24/2021     Lab Results   Component Value Date    LABA1C 5.4 09/21/2012     Lab Results   Component Value Date     09/21/2012     No results found for: VITD25  No results found for: CRP    Nutrition Interventions:   Food and/or Nutrient Delivery:  Continue Current Diet  Nutrition Education/Counseling:  Education initiated   Coordination of Nutrition Care:  Continue to monitor while inpatient    Goals:  PO > meals with lower sodium food choices       Nutrition Monitoring and Evaluation:   Behavioral-Environmental Outcomes:  None Identified   Food/Nutrient Intake Outcomes:  Food and Nutrient Intake  Physical Signs/Symptoms Outcomes:  Biochemical Data, Fluid Status or Edema, Weight     Discharge Planning:    Continue current diet     Electronically signed by Leonid Chao RD, LD on 11/24/21 at 8:05 AM EST    Contact: [phone]

## 2021-11-24 NOTE — CARE COORDINATION
Spoke with Pt in regard to no PCP listed for her. States she used to see Cedric Melchor CNP but he left the [facility] and she hasn't been back there since. Expresses desire to stay with that clinic and informed that there are currently 3 CNPs at the office to provide care. States it is ok to scheduled a follow up appt with any of the providers there. No other questions voiced at present.      Appt made with Glenda Corona CNP for 12/9/21 at 11:30 AM    Marion Garcia RN

## 2021-11-24 NOTE — DISCHARGE SUMMARY
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 female admitted with cardiomyopathy due to Covid-19. She presented to the emergency room with complaints of leg edema. She stated that her right leg was worse than the left. Patient was positive for COVID-19 on . Patient stated she was ill approximately a week prior to her positive test.  She states overall her shortness of breath has improved but continues to have a mild cough. She denied fever chills. She stated she has never had previous leg swelling prior. She did complain of some mild chest heaviness but denied pain. She denied back pain or further radiation. Patient denies history of DVT or pulmonary embolism. Patient denies any renal disease. She does state that she has a history of a valve replacement in [facility] several years ago but has had no issues since that time. CTA of her chest was negative for pulmonary emboli or other acute process. EKG showed sinus rhythm. Her BNP was elevated at 1724 and mild elevation of her troponin at 25. Urinalysis was negative. She did have elevated WBC count of 16.7. Cardiology was consulted. Echocardiogram was completed. Cardiomyopathy was ruled out. Patient feels better. Cleared for DC by Cardiology. Patient will follow up with her PCP. Consultants:  Dr. Rodriguez Courser, cardiology    Procedures: Echochardiogram    Complications: none    Discharge Condition: fair    Exam:  GEN:    Awake, alert and oriented x3. EYES:   EOMI, pupils equal   NECK: Supple. No lymphadenopathy.   No carotid bruit  CVS:     regular rate and rhythm, systolic murmur  PULM:  CTA, no wheezes, rales or rhonchi, no acute respiratory distress  ABD:     Bowels sounds normal.  Abdomen is soft. No distention. no tenderness to palpation. EXT:     no edema bilaterally . No calf tenderness. NEURO: Moves all extremities. Motor and sensory are grossly intact  SKIN:    No rashes. No skin lesions.       Significant Diagnostic Studies:   Lab Results   Component Value Date    WBC 13.3 (H) 11/24/2021    HGB 12.8 11/24/2021     11/24/2021       Lab Results   Component Value Date    BUN 16 11/24/2021    CREATININE 0.77 11/24/2021     11/24/2021    K 3.1 (L) 11/24/2021    CALCIUM 7.9 (L) 11/24/2021    CL 98 11/24/2021    CO2 30 11/24/2021    LABGLOM >60 11/24/2021       Lab Results   Component Value Date    WBCUA 0 TO 2 11/23/2021    RBCUA None 11/23/2021    EPITHUA 0 TO 2 11/23/2021    LEUKOCYTESUR SMALL (A) 11/23/2021    SPECGRAV 1.010 11/23/2021    GLUCOSEU NEGATIVE 11/23/2021    KETUA NEGATIVE 11/23/2021    PROTEINU NEGATIVE 11/23/2021    HGBUR NEGATIVE 11/23/2021    CASTUA NOT REPORTED 11/23/2021    CRYSTUA NOT REPORTED 11/23/2021    BACTERIA NOT REPORTED 11/23/2021    YEAST NOT REPORTED 11/23/2021       Echo Complete    Result Date: 11/23/2021  [facility] Transthoracic Echocardiography Report (TTE)  Patient Name Lorena Holley   Date of Study           11/23/2021               MANUELA CUADRA   Date of      1980  Gender                  Female  Birth   Age          42 year(s)  Race                       Room Number  0321        Height:                 65 inch, 165.1 cm   Corporate ID [id number]    Weight:                 226 pounds, 102.5 kg  #   Patient Acct [de-identified]   BSA:        2.08 m^2    BMI:        37.61 kg/m^2  #   MR #         [id number]      Sonographer             Work,[employer]   Accession #  [id number]  Interpreting Physician  [address]   Fellow                   Referring Nurse Practitioner   Interpreting             Referring Physician     Emmanuel Greer,  Fellow                                           BETINA *  Type of Study   TTE procedure:2D Echocardiogram, M-Mode, Doppler, Color Doppler. Procedure Date Date: 11/23/2021 Start: 03:56 PM Study Location: [facility] Indications:Congestive heart failure. History / Tech. Comments: Dx: CHF Hx: HTN Patient Status: Inpatient Height: 65 inches Weight: 226 pounds BSA: 2.08 m^2 BMI: 37.61 kg/m^2 BP: 152/92 mmHg CONCLUSIONS Summary Global left ventricular systolic function appears preserved with an estimated ejection fraction of >60%. Mildly increased left ventricular wall thickness with a normal left ventricular cavity size. Aortic leaflet calcification with mild aortic stenosis with a mean gradient of 13 mmHg. Mild aortic regurgitation was seen. Evidence of moderate diastolic dysfunction is seen. No previous studies were available for comparison. Other than perhaps moderate (grade II) diastolic dysfunction, no significant structural cause of heart failure was identified from this study. Signature ----------------------------------------------------------------------------  Electronically signed by Esperanza Leger(Sonographer) on 11/23/2021 04:57 PM ---------------------------------------------------------------------------- ----------------------------------------------------------------------------  Electronically signed by Kenrick Rangel(Interpreting physician) on  11/23/2021 06:54 PM ---------------------------------------------------------------------------- FINDINGS Left Atrium Left atrium is normal in size. Left Ventricle Global left ventricular systolic function appears preserved with an estimated ejection fraction of >60%. Mildly increased left ventricular wall thickness with a normal left ventricular cavity size. Right Atrium Right atrium is normal in size.  Right Ventricle Normal right ventricular size and effusion or pneumothorax. Stable mild prominence cardiomediastinal silhouette with redemonstration of postsurgical change. The osseous structures are intact without acute process. Stable chest without acute process. CTA CHEST W CONTRAST    Result Date: 11/23/2021  EXAMINATION: CTA OF THE CHEST 11/23/2021 12:18 pm TECHNIQUE: CTA of the chest was performed after the administration of intravenous contrast.  Multiplanar reformatted images are provided for review. MIP images are provided for review. Dose modulation, iterative reconstruction, and/or weight based adjustment of the mA/kV was utilized to reduce the radiation dose to as low as reasonably achievable. COMPARISON: Chest radiograph today. Chest CT 10/01/2017. HISTORY: ORDERING SYSTEM PROVIDED HISTORY: hx thoracic aneursym repair with graft, chest heaviness TECHNOLOGIST PROVIDED HISTORY: hx thoracic aneursym repair with graft, chest heaviness Decision Support Exception - unselect if not a suspected or confirmed emergency medical condition->Emergency Medical Condition (MA) FINDINGS: Vascular: Postoperative findings status post ascending aortic repair again demonstrated and unchanged. No dissection or aneurysmal dilatation. No acute thoracic aortic abnormality identified. The arch vessels are well opacified. The pulmonary arteries are appropriately opacified without evidence for embolism. Mediastinum: No evidence of mediastinal lymphadenopathy. The heart and pericardium demonstrate no acute abnormality. There is no acute abnormality of the thoracic aorta. Lungs/pleura: The lungs are without acute process. No focal consolidation or pulmonary edema. No evidence of pleural effusion or pneumothorax. Upper Abdomen: No acute findings. Soft Tissues/Bones: No acute bone or soft tissue abnormality. 1.  No acute thoracic aortic abnormality identified. Unchanged appearance of ascending aortic repair. 2.  No evidence for acute pulmonary embolism.  3.  No acute airspace disease identified. 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 ([facility] [address] 11/23/2021    Hx of thoracic aortic aneurysm repair 11/23/2021    Hypertension 09/13/2012    Tobacco abuse 09/13/2012    Tobacco abuse counseling 09/13/2012        Discharge Medications:         Medication List      CONTINUE taking these medications    albuterol sulfate  (90 Base) MCG/ACT inhaler  Commonly known as: ProAir HFA  Inhale 2 puffs into the lungs every 6 hours as needed for Wheezing     EXCEDRIN MIGRAINE PO     ibuprofen 200 MG tablet  Commonly known as: ADVIL;MOTRIN     metoprolol succinate 25 MG extended release tablet  Commonly known as: Toprol XL  Take 1 tablet by mouth daily     tiZANidine 4 MG tablet  Commonly known as: ZANAFLEX  Take 1 tablet by mouth every 8 hours as needed (Back pain)            Patient Instructions: Activity: activity as tolerated  Diet: regular diet  Wound Care: none needed  Other: None     Disposition:   Discharge to Home    Follow up:  Patient will be followed by No primary care provider on file.  in 1-2 weeks    CORE MEASURES on Discharge (if applicable)  ACE/ARB in CHF: NA  Statin in MI: NA  ASA in MI: NA  Statin in CVA: NA  Antiplatelet in CVA: NA    Total time spent on discharge services: 35 minutes    Including the following activities:  Evaluation and Management of patient  Discussion with patient and/or surrogate about current care plan  Coordination with Case Management and/or   Coordination of care with Consultants (if applicable)   Coordination of care with Receiving Facility Physician (if applicable)  Completion of DME forms (if applicable)  Preparation of Discharge Summary  Preparation of Medication Reconciliation  Preparation of Discharge Prescriptions    Signed:  Pryor Kawasaki, APRN - CNP, CORINNA, NP-C  11/24/2021, 5:36 PM

## 2021-11-24 NOTE — PROGRESS NOTES
Vitals and reassessment complete at this time. Patient slept off and on throughout the night. Denies chest pain throughout the night. Vitals are within normal limits. Patient aware the cardiologist will be talking with her some time today. Will continue to monitor and assess.

## 2021-11-24 NOTE — PROGRESS NOTES
Writer went over discharge instructions with patient and removed IV. Catheter tip remained intact. Dressing applied. Patient denied having any questions regarding discharge instructions. Patient getting dressing and calling for transportation.

## 2021-11-24 NOTE — DISCHARGE INSTR - DIET
Good nutrition is important when healing from an illness, injury, or surgery. Follow any nutrition recommendations given to you during your hospital stay. If you were given an oral nutrition supplement while in the hospital, continue to take this supplement at home. You can take it with meals, in-between meals, and/or before bedtime. These supplements can be purchased at most local grocery stores, pharmacies, and chain [employer]-stores. If you have any questions about your diet or nutrition, call the hospital and ask for the dietitian.   Regular diet Please call patient to schedule video visit

## 2021-11-24 NOTE — PROGRESS NOTES
Patient in bed watching TV. Assessment and vitals completed. Patient is alert and oriented. Patient denies having needs. Denies numbness, tingling, chest pain, or dizziness. Legs contain trace swelling bilaterally. Call light within reach. Will continue to monitor.

## 2021-11-24 NOTE — H&P
11/13/21  Yes JUNIOR [facility]   albuterol sulfate HFA (PROAIR HFA) 108 (90 Base) MCG/ACT inhaler Inhale 2 puffs into the lungs every 6 hours as needed for Wheezing 11/13/21  Yes Nguyen Beltran PA-C   tiZANidine (ZANAFLEX) 4 MG tablet Take 1 tablet by mouth every 8 hours as needed (Back pain) 7/13/21  Yes Urusla Ibarra MD   ibuprofen (ADVIL;MOTRIN) 200 MG tablet Take 600 mg by mouth every 6 hours as needed. Yes Historical Provider, MD       Allergies:  Patient has no known allergies. Social History:   TOBACCO:   reports that she has been smoking cigarettes. She has a 39.00 pack-year smoking history. She has never used smokeless tobacco.  ETOH:   reports current alcohol use. Family History:       Problem Relation Age of Onset    Heart Disease Father         enlarged    High Blood Pressure Other     Diabetes Other     Cancer Other         uncle and breast cancer in aunt       Allergies:  Patient has no known allergies. Medications Prior to Admission:    Prior to Admission medications    Medication Sig Start Date End Date Taking? Authorizing Provider   Aspirin-Acetaminophen-Caffeine (EXCEDRIN MIGRAINE PO) Take by mouth   Yes Historical Provider, MD   metoprolol succinate (TOPROL XL) 25 MG extended release tablet Take 1 tablet by mouth daily 11/13/21  Yes Nguyen Beltran PA-C   albuterol sulfate HFA (PROAIR HFA) 108 (90 Base) MCG/ACT inhaler Inhale 2 puffs into the lungs every 6 hours as needed for Wheezing 11/13/21  Yes Nguyen Beltran PA-C   tiZANidine (ZANAFLEX) 4 MG tablet Take 1 tablet by mouth every 8 hours as needed (Back pain) 7/13/21  Yes Ursula Ibarra MD   ibuprofen (ADVIL;MOTRIN) 200 MG tablet Take 600 mg by mouth every 6 hours as needed. Yes Historical Provider, MD       Review of Systems:  Constitutional:negative  for fevers, and negative for chills.   Eyes: negative for visual disturbance   ENT: negative for sore throat, negative nasal congestion, and negative for earache  Respiratory: Positive for shortness of breath, positive for cough, and negative for wheezing  Cardiovascular: negative for chest pain, negative for palpitations, and negative for syncope  Gastrointestinal: negative for abdominal pain, negative for nausea,negative for vomiting, negative for diarrhea, negative for constipation, and negative for hematochezia or melena  Genitourinary: negative for dysuria, negative for urinary urgency, negative for urinary frequency, and negative for hematuria  Skin: negative for skin rash, and negative for skin lesions  Neurological: negative for unilateral weakness, numbness or tingling. Physical Exam:    Vitals:   Temp: 96.9 °F (36.1 °C)  BP: 121/78  Resp: 18  Pulse: 68  SpO2: 100 %  24HR INTAKE/OUTPUT:      Intake/Output Summary (Last 24 hours) at 11/24/2021 1331  Last data filed at 11/24/2021 0843  Gross per 24 hour   Intake 10 ml   Output 1150 ml   Net -1140 ml       Weight    Body mass index is 37.13 kg/m². Exam:  GEN:    Awake, alert and oriented x3. EYES:  EOMI, pupils equal   NECK: Supple. No lymphadenopathy. No carotid bruit  CVS:    regular rate and rhythm, systolic murmur  PULM:  CTA, no wheezes, rales or rhonchi, no acute respiratory distress  ABD:    Bowels sounds normal.  Abdomen is soft. No distention. no tenderness to palpation. EXT:   no edema bilaterally . No calf tenderness. NEURO: Moves all extremities. Motor and sensory are grossly intact  SKIN:  No rashes.   No skin lesions.    -----------------------------------------------------------------  Diagnostic Data:     DATA:    CBC:   Lab Results   Component Value Date    WBC 13.3 (H) 11/24/2021    RBC 4.49 11/24/2021    HGB 12.8 11/24/2021    HCT 39.0 11/24/2021    MCV 86.9 11/24/2021     11/24/2021        CMP:   Lab Results   Component Value Date    GLUCOSE 97 11/24/2021    BUN 16 11/24/2021    CREATININE 0.77 11/24/2021     11/24/2021    K 3.1 (L) 11/24/2021    CALCIUM 7.9 (L) 11/24/2021    CL 98 11/24/2021    CO2 30 11/24/2021    PROT 6.6 12/01/2015    LABALBU 4.0 12/01/2015    BILITOT 0.40 12/01/2015    ALKPHOS 103 12/01/2015    ALT 10 12/01/2015    AST 11 12/01/2015       UA:   Lab Results   Component Value Date    COLORU Yellow 11/23/2021    SPECGRAV 1.010 11/23/2021    WBCUA 0 TO 2 11/23/2021    RBCUA None 11/23/2021    EPITHUA 0 TO 2 11/23/2021    LEUKOCYTESUR SMALL (A) 11/23/2021    GLUCOSEU NEGATIVE 11/23/2021    KETUA NEGATIVE 11/23/2021    PROTEINU NEGATIVE 11/23/2021    HGBUR NEGATIVE 11/23/2021    CASTUA NOT REPORTED 11/23/2021    CRYSTUA NOT REPORTED 11/23/2021    BACTERIA NOT REPORTED 11/23/2021    YEAST NOT REPORTED 11/23/2021       Lactic Acid:   No results found for: LACTA    D-Dimer:  Lab Results   Component Value Date    DDIMER 0.85 (H) 09/30/2017       PT/INR:  Lab Results   Component Value Date    PROTIME 10.3 09/30/2017    INR 1.0 09/30/2017       High Sensitivity Troponin:  Recent Labs     11/23/21  1030 11/23/21  1128 11/24/21  0437   TROPHS 25* 23* 23*       ABGs:   No results found for: PHART, PH, OTF9CBW, PCO2, PO2ART, PO2, JRP3COD, HCO3, BEART, BE, THGBART, THB, UKA0DLU, V0FKEIHU, O2SAT, FIO2        CTA CHEST W CONTRAST   Final Result   1. No acute thoracic aortic abnormality identified. Unchanged appearance of   ascending aortic repair. 2.  No evidence for acute pulmonary embolism. 3.  No acute airspace disease identified. XR CHEST PORTABLE   Final Result   Stable chest without acute process.                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 ([facility] [address] 11/23/2021    Hx of thoracic aortic aneurysm repair 11/23/2021    Hypertension 09/13/2012    Tobacco abuse 09/13/2012    Tobacco abuse counseling 09/13/2012       Plan:     · This patient requires inpatient admission because of cardiomyopathy due to COVID-19 virus  · Factors affecting the medical complexity of this patient include hypertension  · Estimated length of stay is 2 days  · 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   · History of thoracic aortic aneurysm repair  · Appreciate cardiology  · No isolation  · Echocardiogram-EF 60%  · Troponin-stable  · Lasix IV  · Hypertension  · Continue Toprol-XL  · DVT prophylaxis: Lovenox  · Peptic ulcer prophylaxis: Pepcid  · High risk medications: none  · Social Service and Case Management consults for DC planning  · Dietician consult initiated    CORE MEASURES  DVT prophylaxis: Lovenox  Decubitus ulcer present on admission: No  CODE STATUS: FULL CODE  Nutrition Status: fair   Physical therapy: No   Old Charts reviewed: Yes  EKG Reviewed:  Yes  Advance Directive Addressed: Yes    CORINNA Sebastian CNP, CORINNA, NP-C  11/24/2021, 1:31 PM

## 2021-11-24 NOTE — PROGRESS NOTES
Patient in room watching tv. Assessment and vitals completed. Vitals stable and WDL. Patient denies having needs. Call light within reach. Will continue to monitor.

## 2021-11-24 NOTE — ACP (ADVANCE CARE PLANNING)
Advance Care Planning     Advance Care Planning Activator (Inpatient)  Conversation Note      Date of ACP Conversation: 11/24/2021     Conversation Conducted with: Patient with Decision Making Capacity    ACP Activator: Trina Cates, [address]:     Current Designated Health Care Decision Maker:     Primary Decision Maker: Isabela Bernicer - Brother/Sister - [phone]    Secondary Decision Maker: Harsh Santos - Brother/Sister - [phone]    Today we discussed Healthcare Decision Makers. The patient is considering options. Care Preferences    Ventilation: \"If you were in your present state of health and suddenly became very ill and were unable to breathe on your own, what would your preference be about the use of a ventilator (breathing machine) if it were available to you? \"      Would the patient desire the use of ventilator (breathing machine)?: \"Not sure about that\"    \"If your health worsens and it becomes clear that your chance of recovery is unlikely, what would your preference be about the use of a ventilator (breathing machine) if it were available to you? \"     Would the patient desire the use of ventilator (breathing machine)?: No      Resuscitation  \"CPR works best to restart the heart when there is a sudden event, like a heart attack, in someone who is otherwise healthy. Unfortunately, CPR does not typically restart the heart for people who have serious health conditions or who are very sick. \"    \"In the event your heart stopped as a result of an underlying serious health condition, would you want attempts to be made to restart your heart (answer \"yes\" for attempt to resuscitate) or would you prefer a natural death (answer \"no\" for do not attempt to resuscitate)? \" yes       [] Yes   [x] No   Educated Patient / Amada Torres regarding differences between Advance Directives and portable DNR orders.     Length of ACP Conversation in minutes:      Tyron Fagan

## 2021-11-25 NOTE — CONSULTS
Patient: Flora Le  : 1980  Date of Admission: 2021  Primary Care Physician: No primary care provider on file. Today's Date: 2021    REASON FOR CONSULTATION: Leg Swelling (leg swelling right worse than left) and Positive For Covid-19 (positive on )      HPI: Ms. Julio Holguin is a 42 y.o. female who was admitted to the hospital with a recent covid-19 infection with a cough, some increased shortness of breath and throat pain. She has a known history of a Thoracic aneurysm repair in the past at the [facility] and a family history of a severe cardiomyopathy in her father who reportedly  in his 29's from this leading to hospitalization and this consultation. Ms. Julio Holguin denied any chest pain now or in the recent past but says she has had some pretty severe throat pain related to a sore throat and her Covid infection as well as a cough. She denies any association with exertion. Ms. Julio Holguin also denied any current or recent abdominal pain, bleeding problems, problems with her medications or any other concerns at this time. Past Medical History:   Diagnosis Date    COVID-19 2021    GERD (gastroesophageal reflux disease)     Hypertension     Thoracic aortic aneurysm [facility])     s/p repair       CURRENT ALLERGIES: Patient has no known allergies. REVIEW OF SYSTEMS: 14 systems were reviewed. Pertinent positives and negatives as above, all else negative.      Past Surgical History:   Procedure Laterality Date    CARDIAC SURGERY      SOFT TISSUE BIOPSY      THORACIC AORTIC ANEURYSM REPAIR      Social History:  Social History     Tobacco Use    Smoking status: Current Every Day Smoker     Packs/day: 1.50     Years: 26.00     Pack years: 39.00     Types: Cigarettes    Smokeless tobacco: Never Used   Substance Use Topics    Alcohol use: Yes     Comment: rarely    Drug use: No        CURRENT MEDICATIONS:  Outpatient Medications Marked as Taking for the 11/23/21 encounter [facility] Encounter)   Medication Sig Dispense Refill    Aspirin-Acetaminophen-Caffeine (EXCEDRIN MIGRAINE PO) Take by mouth      metoprolol succinate (TOPROL XL) 25 MG extended release tablet Take 1 tablet by mouth daily 30 tablet 0    albuterol sulfate HFA (PROAIR HFA) 108 (90 Base) MCG/ACT inhaler Inhale 2 puffs into the lungs every 6 hours as needed for Wheezing 18 g 0    tiZANidine (ZANAFLEX) 4 MG tablet Take 1 tablet by mouth every 8 hours as needed (Back pain) 12 tablet 0    ibuprofen (ADVIL;MOTRIN) 200 MG tablet Take 600 mg by mouth every 6 hours as needed. No current facility-administered medications for this encounter. FAMILY HISTORY: family history includes Cancer in an other family member; Diabetes in an other family member; Heart Disease in her father; High Blood Pressure in an other family member. PHYSICAL EXAM:   /73   Pulse 64   Temp 97 °F (36.1 °C) (Temporal)   Resp 18   Ht 5' 5\" (1.651 m)   Wt 223 lb 1.6 oz (101.2 kg)   LMP 11/04/2021   SpO2 98%   BMI 37.13 kg/m²  Body mass index is 37.13 kg/m². [ INSTRUCTIONS:  \"[x]\" Indicates a positive item  \"[]\" Indicates a negative item   Vital Signs: (As obtained by patient/caregiver or practitioner observation)    Blood pressure-  Heart rate-    Respiratory rate-    Temperature-  Pulse oximetry-     Constitutional: [x] Appears well-developed and well-nourished [x] No apparent distress      [] Abnormal-   Mental status  [x] Alert and awake  [x] Oriented to person/place/time [x]Able to follow commands      Eyes:  EOM    [x]  Normal  [] Abnormal-  Sclera  [x]  Normal  [] Abnormal -         Discharge [x]  None visible  [] Abnormal -    HENT:   [x] Normocephalic, atraumatic.   [] Abnormal   [] Mouth/Throat: Mucous membranes are moist.     External Ears [x] Normal  [] Abnormal-     Neck: [x] No visualized mass     Pulmonary/Chest: [x] Respiratory effort normal.  [x] No visualized signs of difficulty breathing or respiratory distress        [] Abnormal-     Neurological:        [x] No Facial Asymmetry (Cranial nerve 7 motor function) (limited exam to video visit)          [] No gaze palsy        [] Abnormal-         Skin:        [x] No significant exanthematous lesions or discoloration noted on facial skin         [] Abnormal-            Psychiatric:       [x] Normal Affect [] No Hallucinations        [] Abnormal-     Other pertinent observable physical exam findings: None       MOST RECENT LABS ON RECORD:   Lab Results   Component Value Date    WBC 13.3 (H) 11/24/2021    HGB 12.8 11/24/2021    HCT 39.0 11/24/2021     11/24/2021    CHOL 162 09/21/2012    TRIG 116 09/21/2012    HDL 30 (L) 09/21/2012    LDLCHOLESTEROL 109 (H) 09/21/2012    ALT 10 12/01/2015    AST 11 12/01/2015     11/24/2021    K 3.1 (L) 11/24/2021    CL 98 11/24/2021    CREATININE 0.77 11/24/2021    BUN 16 11/24/2021    CO2 30 11/24/2021    TSH 3.25 11/23/2021    INR 1.0 09/30/2017    LABA1C 5.4 09/21/2012    LABMICR 6 09/21/2012        ASSESSMENT:  Patient Active Problem List    Diagnosis Date Noted    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 Continue standard care. No additional testing or treatment indicated at this time. Once again, thank you for allowing me to participate in this patients care. Please do not hesitate to contact me if I could be of any further assistance. Sincerely,  Isa Lutz. Claier SHAFFER, MS, F.A.C.C.   [facility] Specialist    [address] [place] [address] [place], [place], [facility]  Phone: [phone], Fax: [phone]     I believe that the risk of significant morbidity and mortality related to the patient's current medical conditions are: low-intermediate. November 24, 2021     Viola Knowles is a 42 y.o. female being evaluated by a Virtual Visit (video visit) encounter to address concerns as mentioned above. A caregiver was present when appropriate. Due to this being a TeleHealth encounter (During RTOJP-36 public health emergency), evaluation of the following organ systems was limited: Vitals/Constitutional/EENT/Resp/CV/GI//MS/Neuro/Skin/Heme-Lymph-Imm. Pursuant to the emergency declaration under the [address] [place] [address] authority and the [employer] Act, this Virtual Visit was conducted with patient's (and/or legal guardian's) consent, to reduce the patient's risk of exposure to COVID-19 and provide necessary medical care. The patient (and/or legal guardian) has also been advised to contact this office for worsening conditions or problems, and seek emergency medical treatment and/or call 911 if deemed necessary. Services were provided through a video synchronous discussion virtually to substitute for in-person visit. Ms. Pal Callahan was located in the patients hospital room in [facility]  Alec Berumen MD performed the visit from my home in [facility]    --Alec Berumen MD on 11/24/2021 at 11:38 PM    An electronic signature was used to authenticate this note.

## 2021-11-25 NOTE — PROGRESS NOTES
Physician Progress Note      PATIENT:               Choco Morales  CSN #:                  [id number]  :                       1980  ADMIT DATE:       2021 9:32 AM  DISCH DATE:        2021 6:13 PM  RESPONDING  PROVIDER #:        Cody Mayberry MD          QUERY TEXT:    Patient admitted with leg edema. ED provider documented acute congestive   heart failure. Pro-BNP 1744, echo showed moderate diastolic dysfunction, treated with IV   Lasix. If possible, please document in the progress notes and discharge summary if   acute congestive heart failure (CHF)  was: The medical record reflects the following:  Risk Factors:  h/o thoracic aortic aneurysm repair, recent Covid-19, pt denied   hx of CHF  Clinical Indicators: leg edema, chest heaviness, Pro-BNP 1744,   Echo: EF   >60%, moderate diastolic dysfunction  Treatment: Lasix 40 mg IV twice daily    Brody Sorto RN, [address] [place] [address]   [phone]  . Options provided:  -- Acute diastolic CHF confirmed after study  -- CHF ruled out after study  -- Other - I will add my own diagnosis  -- Disagree - Not applicable / Not valid  -- Disagree - Clinically unable to determine / Unknown  -- Refer to Clinical Documentation Reviewer    PROVIDER RESPONSE TEXT:    Acute diastolic CHF confirmed after study.     Query created by: Lois Fulton on 2021 6:41 PM      Electronically signed by:  Cody Mayberry MD 2021 8:14 PM

## 2022-01-05 ENCOUNTER — HOSPITAL ENCOUNTER (OUTPATIENT)
Age: 42
Setting detail: SPECIMEN
Discharge: HOME | End: 2022-01-05

## 2022-01-06 LAB
ABSOLUTE EOS #: 0.06 K/UL (ref 0–0.44)
ABSOLUTE IMMATURE GRANULOCYTE: <0.03 K/UL (ref 0–0.3)
ABSOLUTE LYMPH #: 2.13 K/UL (ref 1.1–3.7)
ABSOLUTE MONO #: 0.54 K/UL (ref 0.1–1.2)
ALBUMIN SERPL-MCNC: 3.8 G/DL (ref 3.5–5.2)
ALBUMIN/GLOBULIN RATIO: 1.5 (ref 1–2.5)
ALP BLD-CCNC: 102 U/L (ref 35–104)
ALT SERPL-CCNC: 13 U/L (ref 5–33)
ANION GAP SERPL CALCULATED.3IONS-SCNC: 12 MMOL/L (ref 9–17)
AST SERPL-CCNC: 19 U/L
BASOPHILS # BLD: 1 % (ref 0–2)
BASOPHILS ABSOLUTE: 0.04 K/UL (ref 0–0.2)
BILIRUB SERPL-MCNC: 0.3 MG/DL (ref 0.3–1.2)
BUN BLDV-MCNC: 8 MG/DL (ref 6–20)
BUN/CREAT BLD: ABNORMAL (ref 9–20)
CALCIUM SERPL-MCNC: 9.3 MG/DL (ref 8.6–10.4)
CHLORIDE BLD-SCNC: 103 MMOL/L (ref 98–107)
CHOLESTEROL, FASTING: 183 MG/DL
CHOLESTEROL/HDL RATIO: 4.8
CO2: 25 MMOL/L (ref 20–31)
CREAT SERPL-MCNC: 0.82 MG/DL (ref 0.5–0.9)
DIFFERENTIAL TYPE: ABNORMAL
EOSINOPHILS RELATIVE PERCENT: 1 % (ref 1–4)
GFR AFRICAN AMERICAN: >60 ML/MIN
GFR NON-AFRICAN AMERICAN: >60 ML/MIN
GFR SERPL CREATININE-BSD FRML MDRD: ABNORMAL ML/MIN/{1.73_M2}
GFR SERPL CREATININE-BSD FRML MDRD: ABNORMAL ML/MIN/{1.73_M2}
GLUCOSE BLD-MCNC: 71 MG/DL (ref 70–99)
HCT VFR BLD CALC: 42.9 % (ref 36.3–47.1)
HDLC SERPL-MCNC: 38 MG/DL
HEMOGLOBIN: 13.7 G/DL (ref 11.9–15.1)
IMMATURE GRANULOCYTES: 0 %
LDL CHOLESTEROL: 127 MG/DL (ref 0–130)
LYMPHOCYTES # BLD: 25 % (ref 24–43)
MAGNESIUM: 2.1 MG/DL (ref 1.6–2.6)
MCH RBC QN AUTO: 28.3 PG (ref 25.2–33.5)
MCHC RBC AUTO-ENTMCNC: 31.9 G/DL (ref 28.4–34.8)
MCV RBC AUTO: 88.6 FL (ref 82.6–102.9)
MONOCYTES # BLD: 6 % (ref 3–12)
NRBC AUTOMATED: 0 PER 100 WBC
PDW BLD-RTO: 14.4 % (ref 11.8–14.4)
PLATELET # BLD: 283 K/UL (ref 138–453)
PLATELET ESTIMATE: ABNORMAL
PMV BLD AUTO: 11.1 FL (ref 8.1–13.5)
POTASSIUM SERPL-SCNC: 3.6 MMOL/L (ref 3.7–5.3)
RBC # BLD: 4.84 M/UL (ref 3.95–5.11)
RBC # BLD: ABNORMAL 10*6/UL
SEG NEUTROPHILS: 67 % (ref 36–65)
SEGMENTED NEUTROPHILS ABSOLUTE COUNT: 5.62 K/UL (ref 1.5–8.1)
SODIUM BLD-SCNC: 140 MMOL/L (ref 135–144)
TOTAL PROTEIN: 6.4 G/DL (ref 6.4–8.3)
TRIGLYCERIDE, FASTING: 88 MG/DL
TSH SERPL DL<=0.05 MIU/L-ACNC: 1.54 MIU/L (ref 0.3–5)
VLDLC SERPL CALC-MCNC: ABNORMAL MG/DL (ref 1–30)
WBC # BLD: 8.4 K/UL (ref 3.5–11.3)
WBC # BLD: ABNORMAL 10*3/UL

## 2022-01-19 ENCOUNTER — HOSPITAL ENCOUNTER (OUTPATIENT)
Age: 42
Setting detail: SPECIMEN
Discharge: HOME | End: 2022-01-19

## 2022-01-20 LAB
-: ABNORMAL
AMORPHOUS: ABNORMAL
BACTERIA: ABNORMAL
BILIRUBIN URINE: ABNORMAL
CASTS UA: ABNORMAL /LPF (ref 0–2)
COLOR: ABNORMAL
COMMENT UA: ABNORMAL
CRYSTALS, UA: ABNORMAL /HPF
EPITHELIAL CELLS UA: ABNORMAL /HPF (ref 0–5)
GLUCOSE URINE: NEGATIVE
KETONES, URINE: NEGATIVE
LEUKOCYTE ESTERASE, URINE: NEGATIVE
MUCUS: ABNORMAL
NITRITE, URINE: POSITIVE
OTHER OBSERVATIONS UA: ABNORMAL
PH UA: 6.5 (ref 5–8)
PROTEIN UA: ABNORMAL
RBC UA: ABNORMAL /HPF (ref 0–2)
RENAL EPITHELIAL, UA: ABNORMAL /HPF
SPECIFIC GRAVITY UA: 1.01 (ref 1–1.03)
TRICHOMONAS: ABNORMAL
TURBIDITY: CLEAR
URINE HGB: ABNORMAL
UROBILINOGEN, URINE: NORMAL
WBC UA: ABNORMAL /HPF (ref 0–5)
YEAST: ABNORMAL

## 2023-03-19 ENCOUNTER — HOSPITAL ENCOUNTER (EMERGENCY)
Age: 43
Discharge: HOME | End: 2023-03-19
Attending: EMERGENCY MEDICINE
Payer: MEDICAID

## 2023-03-19 VITALS
SYSTOLIC BLOOD PRESSURE: 155 MMHG | HEART RATE: 98 BPM | WEIGHT: 210 LBS | DIASTOLIC BLOOD PRESSURE: 94 MMHG | TEMPERATURE: 98.1 F | HEIGHT: 65 IN | RESPIRATION RATE: 16 BRPM | OXYGEN SATURATION: 96 % | BODY MASS INDEX: 34.99 KG/M2

## 2023-03-19 DIAGNOSIS — T78.40XA ALLERGIC REACTION, INITIAL ENCOUNTER: Primary | ICD-10-CM

## 2023-03-19 DIAGNOSIS — Z76.0 MEDICATION REFILL: ICD-10-CM

## 2023-03-19 PROCEDURE — 99283 EMERGENCY DEPT VISIT LOW MDM: CPT

## 2023-03-19 PROCEDURE — 6370000000 HC RX 637 (ALT 250 FOR IP): Performed by: EMERGENCY MEDICINE

## 2023-03-19 RX ORDER — PREDNISONE 50 MG/1
50 TABLET ORAL DAILY
Qty: 2 TABLET | Refills: 0 | Status: SHIPPED | OUTPATIENT
Start: 2023-03-20 | End: 2023-03-22

## 2023-03-19 RX ORDER — ALBUTEROL SULFATE 90 UG/1
2 AEROSOL, METERED RESPIRATORY (INHALATION) EVERY 6 HOURS PRN
Qty: 18 G | Refills: 0 | Status: SHIPPED | OUTPATIENT
Start: 2023-03-19

## 2023-03-19 RX ORDER — DIPHENHYDRAMINE HCL 25 MG
25 TABLET ORAL EVERY 6 HOURS PRN
Qty: 20 TABLET | Refills: 0 | Status: SHIPPED | OUTPATIENT
Start: 2023-03-19 | End: 2023-03-26

## 2023-03-19 RX ORDER — DIPHENHYDRAMINE HCL 25 MG
25 CAPSULE ORAL EVERY 6 HOURS PRN
Status: DISCONTINUED | OUTPATIENT
Start: 2023-03-19 | End: 2023-03-20 | Stop reason: HOSPADM

## 2023-03-19 RX ORDER — METOPROLOL SUCCINATE 25 MG/1
25 TABLET, EXTENDED RELEASE ORAL DAILY
Qty: 30 TABLET | Refills: 0 | Status: SHIPPED | OUTPATIENT
Start: 2023-03-19

## 2023-03-19 RX ADMIN — DIPHENHYDRAMINE HYDROCHLORIDE 25 MG: 25 CAPSULE ORAL at 22:25

## 2023-03-19 RX ADMIN — PREDNISONE 50 MG: 20 TABLET ORAL at 22:25

## 2023-03-19 ASSESSMENT — PAIN SCALES - GENERAL: PAINLEVEL_OUTOF10: 8

## 2023-03-19 ASSESSMENT — PAIN DESCRIPTION - DESCRIPTORS: DESCRIPTORS: BURNING

## 2023-03-19 ASSESSMENT — PAIN - FUNCTIONAL ASSESSMENT: PAIN_FUNCTIONAL_ASSESSMENT: 0-10

## 2023-03-20 NOTE — DISCHARGE INSTRUCTIONS
Prednisone as directed until complete. Use Benadryl every 6 hours as needed for redness or itching of your skin. Do not use Elimite again. Please primary care provider in 2 to 3 days if symptoms not resolved. Please seek medical attention immediately should he develop any acute concerns.

## 2023-03-20 NOTE — ED PROVIDER NOTES
MG tablet Take 1 tablet by mouth every 8 hours as needed (Back pain), Disp-12 tablet, R-0Print      ibuprofen (ADVIL;MOTRIN) 200 MG tablet Take 600 mg by mouth every 6 hours as needed. ALLERGIES     Patient has no known allergies. FAMILY HISTORY       Family History   Problem Relation Age of Onset    Heart Disease Father         enlarged    High Blood Pressure Other     Diabetes Other     Cancer Other         uncle and breast cancer in aunt          SOCIAL HISTORY       Social History     Socioeconomic History    Marital status: Single     Spouse name: None    Number of children: None    Years of education: None    Highest education level: None   Tobacco Use    Smoking status: Every Day     Packs/day: 1.50     Years: 26.00     Pack years: 39.00     Types: Cigarettes    Smokeless tobacco: Never   Vaping Use    Vaping Use: Never used   Substance and Sexual Activity    Alcohol use: Yes     Comment: rarely    Drug use: No       SCREENINGS        Zachariah Coma Scale  Eye Opening: Spontaneous  Best Verbal Response: Oriented  Best Motor Response: Obeys commands  Sidell Coma Scale Score: 15               PHYSICAL EXAM    (up to 7 for level 4, 8 or more for level 5)     ED Triage Vitals [03/19/23 2110]   BP Temp Temp src Heart Rate Resp SpO2 Height Weight   (!) 188/129 98.1 °F (36.7 °C) -- 98 16 96 % 5' 5\" (1.651 m) 210 lb (95.3 kg)       Physical Exam  Vitals and nursing note reviewed. Constitutional:       General: She is not in acute distress. Appearance: She is not toxic-appearing. HENT:      Head: Normocephalic and atraumatic. Cardiovascular:      Rate and Rhythm: Normal rate and regular rhythm. Pulmonary:      Effort: Pulmonary effort is normal.      Breath sounds: Normal breath sounds. Musculoskeletal:         General: Normal range of motion. Skin:     Comments: Anterior chest erythema with a few urticarial lesions. She does have some erythema on her feet and in her groin area.   No

## 2023-03-28 ENCOUNTER — HOSPITAL ENCOUNTER (EMERGENCY)
Age: 43
Discharge: HOME | End: 2023-03-28
Attending: EMERGENCY MEDICINE
Payer: MEDICAID

## 2023-03-28 VITALS
RESPIRATION RATE: 18 BRPM | HEART RATE: 84 BPM | OXYGEN SATURATION: 100 % | SYSTOLIC BLOOD PRESSURE: 136 MMHG | TEMPERATURE: 98 F | DIASTOLIC BLOOD PRESSURE: 84 MMHG

## 2023-03-28 DIAGNOSIS — L29.9 PRURITIC DISORDER: Primary | ICD-10-CM

## 2023-03-28 PROCEDURE — 99283 EMERGENCY DEPT VISIT LOW MDM: CPT

## 2023-03-28 RX ORDER — HYDROXYZINE HYDROCHLORIDE 25 MG/1
25 TABLET, FILM COATED ORAL EVERY 6 HOURS PRN
Qty: 20 TABLET | Refills: 0 | Status: SHIPPED | OUTPATIENT
Start: 2023-03-28 | End: 2023-04-07

## 2023-03-28 ASSESSMENT — PAIN - FUNCTIONAL ASSESSMENT: PAIN_FUNCTIONAL_ASSESSMENT: NONE - DENIES PAIN

## 2023-03-28 NOTE — ED PROVIDER NOTES
Right eye: No discharge. Left eye: No discharge. Extraocular Movements: Extraocular movements intact. Pupils: Pupils are equal, round, and reactive to light. Cardiovascular:      Rate and Rhythm: Normal rate. Pulmonary:      Effort: Pulmonary effort is normal. No respiratory distress. Musculoskeletal:      Right lower leg: No edema. Left lower leg: No edema. Skin:     Comments: Excoriations in R axilla, but no burrows noted, no lesions. No lesions not to bilateral inguinal region bilaterally   Neurological:      General: No focal deficit present. Mental Status: She is alert and oriented to person, place, and time. DDX/DIAGNOSTIC RESULTS / EMERGENCY DEPARTMENT COURSE / MDM     Medical Decision Making  Continued itching after proper treatment for scabies. Discussed likely itching can continue after treatment, no current signs of scabies. Was given steroids? [facility] from [facility]  Will start atarax and then have her follow up with derm         FINAL IMPRESSION      1.  Pruritic disorder          DISPOSITION / PLAN     DISPOSITION Decision To Discharge 03/28/2023 03:55:20 AM      PATIENT REFERRED TO:  Xavier Grullon MD  [address] [place] [address]  [phone]          DISCHARGE MEDICATIONS:  Discharge Medication List as of 3/28/2023  3:56 AM        START taking these medications    Details   hydrOXYzine HCl (ATARAX) 25 MG tablet Take 1 tablet by mouth every 6 hours as needed for Itching, Disp-20 tablet, R-0Normal             Dawood Santacruz DO  Emergency Medicine Resident    (Please note that portions of thisnote were completed with a voice recognition program.  Efforts were made to edit the dictations but occasionally words are mis-transcribed.)        Precious Zhu DO  03/28/23 8439

## 2023-03-28 NOTE — DISCHARGE INSTRUCTIONS
Please follow-up with dermatologist due to your continued itching, and for further skin evaluation use Atarax to help with itching, and return to ER for additional concerns.

## 2023-05-31 ENCOUNTER — HOSPITAL ENCOUNTER (OUTPATIENT)
Age: 43
Setting detail: SPECIMEN
Discharge: HOME | End: 2023-05-31

## 2023-06-01 LAB
25(OH)D3 SERPL-MCNC: 6.9 NG/ML
ALBUMIN SERPL-MCNC: 4.2 G/DL (ref 3.5–5.2)
ALBUMIN/GLOB SERPL: 1.8 {RATIO} (ref 1–2.5)
ALP SERPL-CCNC: 122 U/L (ref 35–104)
ALT SERPL-CCNC: 11 U/L (ref 5–33)
ANION GAP SERPL CALCULATED.3IONS-SCNC: 13 MMOL/L (ref 9–17)
AST SERPL-CCNC: 12 U/L
BASOPHILS # BLD: <0.03 K/UL (ref 0–0.2)
BASOPHILS NFR BLD: 0 % (ref 0–2)
BILIRUB SERPL-MCNC: 0.3 MG/DL (ref 0.3–1.2)
BUN SERPL-MCNC: 7 MG/DL (ref 6–20)
CALCIUM SERPL-MCNC: 9.1 MG/DL (ref 8.6–10.4)
CHLORIDE SERPL-SCNC: 103 MMOL/L (ref 98–107)
CHOLEST SERPL-MCNC: 203 MG/DL
CHOLESTEROL/HDL RATIO: 6
CO2 SERPL-SCNC: 22 MMOL/L (ref 20–31)
CREAT SERPL-MCNC: 0.72 MG/DL (ref 0.5–0.9)
EOSINOPHIL # BLD: 0.07 K/UL (ref 0–0.44)
EOSINOPHILS RELATIVE PERCENT: 1 % (ref 1–4)
ERYTHROCYTE [DISTWIDTH] IN BLOOD BY AUTOMATED COUNT: 13.6 % (ref 11.8–14.4)
GFR SERPL CREATININE-BSD FRML MDRD: >60 ML/MIN/1.73M2
GLUCOSE SERPL-MCNC: 88 MG/DL (ref 70–99)
HCT VFR BLD AUTO: 45.3 % (ref 36.3–47.1)
HDLC SERPL-MCNC: 34 MG/DL
HGB BLD-MCNC: 14.6 G/DL (ref 11.9–15.1)
IMM GRANULOCYTES # BLD AUTO: <0.03 K/UL (ref 0–0.3)
IMM GRANULOCYTES NFR BLD: 0 %
LDLC SERPL CALC-MCNC: 139 MG/DL (ref 0–130)
LYMPHOCYTES # BLD: 29 % (ref 24–43)
LYMPHOCYTES NFR BLD: 2.02 K/UL (ref 1.1–3.7)
MCH RBC QN AUTO: 28.6 PG (ref 25.2–33.5)
MCHC RBC AUTO-ENTMCNC: 32.2 G/DL (ref 28.4–34.8)
MCV RBC AUTO: 88.6 FL (ref 82.6–102.9)
MONOCYTES NFR BLD: 0.58 K/UL (ref 0.1–1.2)
MONOCYTES NFR BLD: 8 % (ref 3–12)
NEUTROPHILS NFR BLD: 62 % (ref 36–65)
NEUTS SEG NFR BLD: 4.16 K/UL (ref 1.5–8.1)
NRBC AUTOMATED: 0 PER 100 WBC
PLATELET # BLD AUTO: 188 K/UL (ref 138–453)
PMV BLD AUTO: 11.7 FL (ref 8.1–13.5)
POTASSIUM SERPL-SCNC: 4.2 MMOL/L (ref 3.7–5.3)
PROT SERPL-MCNC: 6.5 G/DL (ref 6.4–8.3)
RBC # BLD AUTO: 5.11 M/UL (ref 3.95–5.11)
SODIUM SERPL-SCNC: 138 MMOL/L (ref 135–144)
TRIGL SERPL-MCNC: 151 MG/DL
TSH SERPL-MCNC: 3.1 UIU/ML (ref 0.3–5)
WBC OTHER # BLD: 6.9 K/UL (ref 3.5–11.3)

## 2023-06-07 ENCOUNTER — OFFICE VISIT (OUTPATIENT)
Dept: CARDIOLOGY | Age: 43
End: 2023-06-07
Payer: MEDICAID

## 2023-06-07 VITALS
DIASTOLIC BLOOD PRESSURE: 92 MMHG | RESPIRATION RATE: 18 BRPM | SYSTOLIC BLOOD PRESSURE: 152 MMHG | HEIGHT: 64 IN | OXYGEN SATURATION: 98 % | HEART RATE: 72 BPM | WEIGHT: 226.2 LBS | BODY MASS INDEX: 38.62 KG/M2

## 2023-06-07 DIAGNOSIS — E78.2 MIXED HYPERLIPIDEMIA: ICD-10-CM

## 2023-06-07 DIAGNOSIS — Q23.1 BICUSPID AORTIC VALVE: ICD-10-CM

## 2023-06-07 DIAGNOSIS — Z71.6 TOBACCO ABUSE COUNSELING: ICD-10-CM

## 2023-06-07 DIAGNOSIS — E66.09 CLASS 2 OBESITY DUE TO EXCESS CALORIES WITH BODY MASS INDEX (BMI) OF 38.0 TO 38.9 IN ADULT, UNSPECIFIED WHETHER SERIOUS COMORBIDITY PRESENT: ICD-10-CM

## 2023-06-07 DIAGNOSIS — Z98.890 HX OF THORACIC AORTIC ANEURYSM REPAIR: Primary | ICD-10-CM

## 2023-06-07 DIAGNOSIS — Z86.79 HX OF THORACIC AORTIC ANEURYSM REPAIR: Primary | ICD-10-CM

## 2023-06-07 DIAGNOSIS — I10 PRIMARY HYPERTENSION: ICD-10-CM

## 2023-06-07 PROCEDURE — 99204 OFFICE O/P NEW MOD 45 MIN: CPT | Performed by: PHYSICIAN ASSISTANT

## 2023-06-07 PROCEDURE — 3077F SYST BP >= 140 MM HG: CPT | Performed by: PHYSICIAN ASSISTANT

## 2023-06-07 PROCEDURE — 3079F DIAST BP 80-89 MM HG: CPT | Performed by: PHYSICIAN ASSISTANT

## 2023-06-07 PROCEDURE — 93000 ELECTROCARDIOGRAM COMPLETE: CPT | Performed by: INTERNAL MEDICINE

## 2023-06-07 RX ORDER — CHLORTHALIDONE 25 MG/1
25 TABLET ORAL DAILY
COMMUNITY
Start: 2023-05-31

## 2023-06-07 RX ORDER — ATORVASTATIN CALCIUM 40 MG/1
40 TABLET, FILM COATED ORAL DAILY
Qty: 90 TABLET | Refills: 1 | Status: SHIPPED | OUTPATIENT
Start: 2023-06-07

## 2023-06-07 RX ORDER — METOPROLOL SUCCINATE 50 MG/1
50 TABLET, EXTENDED RELEASE ORAL DAILY
COMMUNITY

## 2023-06-07 RX ORDER — AMLODIPINE BESYLATE 5 MG/1
5 TABLET ORAL DAILY
Qty: 90 TABLET | Refills: 1 | Status: CANCELLED | OUTPATIENT
Start: 2023-06-07

## 2023-06-07 NOTE — PATIENT INSTRUCTIONS
SURVEY:    You may be receiving a survey from [employer] regarding your visit today. Please complete the survey to enable us to provide the highest quality of care to you and your family. If you cannot score us a very good on any question, please call the office to discuss how we could have made your experience a very good one. Thank you.

## 2023-06-07 NOTE — PROGRESS NOTES
Coty Guzman am scribing for and in the presence of Savage Gomez PA-C. Patient: Mira Munguia  : 1980  Date of Visit: 2023    REASON FOR VISIT / CONSULTATION: Establish Cardiologist (Previously seen [facility].//She has been doing well. CP just depends on how stressed she is/heartburn. Feels sharp, lasts an hour. SOB w/ walking long distances. Lightheaded/dizziness when she is working in a certain position, eyes sight becomes blurry. Lasting seconds to minutes. No syncope. Palpitations sometimes, can hear her heart beat. )      History of Present Illness:        Dear bP Hall, CORINNA - NP    I had the pleasure of seeing Mira Munguia in my office today. Ms. Jung Alcocer is a 36 y.o. female with a history of atherosclerotic heart disease. She did have open heart completed as well in 2016. S/P supra-coronary ascending aorta replacement via median sternotomy with Dr. Magali Olson on 16. Hx of bicuspid aortic valve with mild AI, aortic root 4.7cm on CTA 16     No history of strokes. She does have hypertension. No diabetes or thyroid issues. She is a current smoker, 1-1.5 packs per day. When she is stressed out, she smokes even more. She used to smoke 2-3 packs a day. She's smoked since she was 11years old. She does drink on occasion. She previously smoked marijuana but not for many years. She does have 3 boys, she was induced with all 3 pregnancies. With the last pregnancy, she did develop high blood pressure and has this ever since. Ages 22, 11 or 14. Family history includes her dad who had suddenly passed away at age 44, he had an enlarged heart, hypertension and heart failure. Ms. Jung Alcocer is here today to establish care. She previously seen [facility] back in 2016. She does have occasional sharp chest pain that sits right in the center of her chest. It is worse after she eats and she doesn't take anything for it.  She reports she does not

## 2023-06-28 ENCOUNTER — HOSPITAL ENCOUNTER (OUTPATIENT)
Dept: NON INVASIVE DIAGNOSTICS | Age: 43
Discharge: HOME | End: 2023-06-28
Payer: MEDICAID

## 2023-06-28 DIAGNOSIS — E78.2 MIXED HYPERLIPIDEMIA: ICD-10-CM

## 2023-06-28 DIAGNOSIS — Q23.1 BICUSPID AORTIC VALVE: ICD-10-CM

## 2023-06-28 DIAGNOSIS — I10 PRIMARY HYPERTENSION: ICD-10-CM

## 2023-06-28 LAB
LV EF: 60 %
LVEF MODALITY: NORMAL

## 2023-06-28 PROCEDURE — 93306 TTE W/DOPPLER COMPLETE: CPT

## 2023-06-29 ENCOUNTER — TELEPHONE (OUTPATIENT)
Dept: CARDIOLOGY | Age: 43
End: 2023-06-29

## 2023-07-24 RX ORDER — ATORVASTATIN CALCIUM 40 MG/1
40 TABLET, FILM COATED ORAL DAILY
Qty: 90 TABLET | Refills: 3 | Status: SHIPPED | OUTPATIENT
Start: 2023-07-24

## 2023-07-24 RX ORDER — METOPROLOL SUCCINATE 50 MG/1
50 TABLET, EXTENDED RELEASE ORAL DAILY
Qty: 90 TABLET | Refills: 3 | Status: SHIPPED | OUTPATIENT
Start: 2023-07-24

## 2023-09-06 ENCOUNTER — TELEPHONE (OUTPATIENT)
Dept: CARDIOLOGY | Age: 43
End: 2023-09-06

## 2023-09-06 DIAGNOSIS — R42 DIZZINESS: ICD-10-CM

## 2023-09-06 DIAGNOSIS — R42 LIGHTHEADED: Primary | ICD-10-CM

## 2023-09-06 NOTE — TELEPHONE ENCOUNTER
Ms. Swenson [place] called in due to being lightheaded and having hands and legs go unmb. She reports she discussed this with you at her initial first visit. She isn't sure if her dose of medications are too strong? The lightheadedness only happens really when she is moving at work and under stress. She never takes her BP when she is lightheaded because she will sit and it passes within seconds so she isn't sure if it would even catch it. Please advise.

## 2023-09-07 NOTE — TELEPHONE ENCOUNTER
Her number is currently out of service. Will try back later. Patient is scheduled for Pitocin IOL on 10/26/20 at 6:00AM. My Chart message sent with IOL info and Covid testing instructions.

## 2023-09-08 ENCOUNTER — HOSPITAL ENCOUNTER (OUTPATIENT)
Age: 43
End: 2023-09-08
Payer: COMMERCIAL

## 2023-09-08 DIAGNOSIS — R42 LIGHTHEADED: ICD-10-CM

## 2023-09-08 DIAGNOSIS — R42 DIZZINESS: ICD-10-CM

## 2023-09-08 PROCEDURE — 93243 EXT ECG>48HR<7D SCAN A/R: CPT

## 2023-09-08 NOTE — TELEPHONE ENCOUNTER
Spoke with Augustus Aparicioch and she has her CAM and tilt scheduled so we pushed her appointment out a couple days so we would have testing completed to go over all results.

## 2023-09-22 ENCOUNTER — HOSPITAL ENCOUNTER (OUTPATIENT)
Age: 43
Discharge: HOME | End: 2023-09-24
Payer: COMMERCIAL

## 2023-09-22 DIAGNOSIS — R42 LIGHTHEADED: ICD-10-CM

## 2023-09-22 DIAGNOSIS — R42 DIZZINESS: ICD-10-CM

## 2023-09-22 LAB
TILT CV INITIAL SUPINE HEART RATE: 64 BPM
TILT CV INITIAL SUPINE MAX BP: NORMAL BPM
TILT CV INITIAL SUPINE RHYTHM: NORMAL
TILT CV INITIAL TILT BLOOD PRESSURE: NORMAL MMHG
TILT CV INITIAL TILT HEART RATE: 69 BPM
TILT CV INITIAL TILT RHYTHM: NORMAL
TILT CV MAX BP BLOOD PRESSURE: NORMAL MMHG
TILT CV MAX BP HEART RATE: 77 BPM
TILT CV MAX BP MINUTES: 2
TILT CV MAX BP RHYTHM: NORMAL
TILT CV MAX HEART RATE: 108 BPM
TILT CV MAX HR BLOOD PRESSURE: NORMAL MMHG
TILT CV MAX HR MINUTES: 23
TILT CV MAX HR RHYTHM: NORMAL
TILT CV MINIMUM BP BLOOD PRESSURE: NORMAL MMHG
TILT CV MINIMUM BP HEART RATE: 108 BPM
TILT CV MINIMUM BP MINUTES: 23
TILT CV MINIMUM BP RHYTHM: NORMAL
TILT CV MINIMUM HR BP: NORMAL MMHG
TILT CV MINIMUM HR HEART RATE: 69 BPM
TILT CV MINIMUM HR MINUTES: 1
TILT CV MINIMUM HR RHYTHM: NORMAL

## 2023-09-22 PROCEDURE — 93660 TILT TABLE EVALUATION: CPT

## 2023-09-22 PROCEDURE — 6370000000 HC RX 637 (ALT 250 FOR IP): Performed by: FAMILY MEDICINE

## 2023-09-22 PROCEDURE — 93660 TILT TABLE EVALUATION: CPT | Performed by: INTERNAL MEDICINE

## 2023-09-22 RX ORDER — NITROGLYCERIN 0.3 MG/1
0.3 TABLET SUBLINGUAL
Status: COMPLETED | OUTPATIENT
Start: 2023-09-22 | End: 2023-09-22

## 2023-09-22 RX ADMIN — NITROGLYCERIN 0.3 MG: 0.3 TABLET SUBLINGUAL at 14:48

## 2023-09-25 ENCOUNTER — TELEPHONE (OUTPATIENT)
Dept: CARDIOLOGY | Age: 43
End: 2023-09-25

## 2023-09-25 NOTE — RESULT ENCOUNTER NOTE
Please let them know their tilt table test was mildly abnormal. Please continue increase fluid intake, moderate salt intake, and compression socks. We will discuss at follow up.

## 2023-09-25 NOTE — TELEPHONE ENCOUNTER
----- Message from Chuck Pérez PA-C sent at 9/25/2023  8:11 AM EDT -----  Please let them know their tilt table test was mildly abnormal. Please continue increase fluid intake, moderate salt intake, and compression socks. We will discuss at follow up.

## 2023-10-02 ENCOUNTER — TELEPHONE (OUTPATIENT)
Dept: CARDIOLOGY | Age: 43
End: 2023-10-02

## 2023-10-02 NOTE — TELEPHONE ENCOUNTER
----- Message from Florentin Haynes PA-C sent at 10/2/2023 12:35 PM EDT -----  Please let them know that their CAM monitor was overall unremarkable. We will discuss at their follow up appointment.

## 2024-05-05 ENCOUNTER — HOSPITAL ENCOUNTER (EMERGENCY)
Age: 44
Discharge: HOME | End: 2024-05-06
Attending: STUDENT IN AN ORGANIZED HEALTH CARE EDUCATION/TRAINING PROGRAM

## 2024-05-05 ENCOUNTER — APPOINTMENT (OUTPATIENT)
Dept: GENERAL RADIOLOGY | Age: 44
End: 2024-05-05

## 2024-05-05 VITALS
DIASTOLIC BLOOD PRESSURE: 82 MMHG | HEART RATE: 84 BPM | RESPIRATION RATE: 17 BRPM | OXYGEN SATURATION: 96 % | SYSTOLIC BLOOD PRESSURE: 130 MMHG | TEMPERATURE: 98.4 F

## 2024-05-05 DIAGNOSIS — J06.9 VIRAL URI WITH COUGH: Primary | ICD-10-CM

## 2024-05-05 DIAGNOSIS — J12.9 VIRAL PNEUMONIA: ICD-10-CM

## 2024-05-05 PROCEDURE — 2500000003 HC RX 250 WO HCPCS: Performed by: STUDENT IN AN ORGANIZED HEALTH CARE EDUCATION/TRAINING PROGRAM

## 2024-05-05 PROCEDURE — 99283 EMERGENCY DEPT VISIT LOW MDM: CPT

## 2024-05-05 RX ORDER — BENZONATATE 100 MG/1
100 CAPSULE ORAL ONCE
Status: COMPLETED | OUTPATIENT
Start: 2024-05-06 | End: 2024-05-06

## 2024-05-05 RX ORDER — GUAIFENESIN 200 MG/10ML
400 LIQUID ORAL ONCE
Status: COMPLETED | OUTPATIENT
Start: 2024-05-06 | End: 2024-05-05

## 2024-05-05 RX ADMIN — GUAIFENESIN 400 MG: 300 SOLUTION ORAL at 23:59

## 2024-05-05 ASSESSMENT — LIFESTYLE VARIABLES
HOW MANY STANDARD DRINKS CONTAINING ALCOHOL DO YOU HAVE ON A TYPICAL DAY: PATIENT DOES NOT DRINK
HOW OFTEN DO YOU HAVE A DRINK CONTAINING ALCOHOL: NEVER

## 2024-05-06 ENCOUNTER — APPOINTMENT (OUTPATIENT)
Dept: GENERAL RADIOLOGY | Age: 44
End: 2024-05-06

## 2024-05-06 PROCEDURE — 6370000000 HC RX 637 (ALT 250 FOR IP): Performed by: STUDENT IN AN ORGANIZED HEALTH CARE EDUCATION/TRAINING PROGRAM

## 2024-05-06 PROCEDURE — 71046 X-RAY EXAM CHEST 2 VIEWS: CPT

## 2024-05-06 RX ORDER — AZITHROMYCIN 250 MG/1
TABLET, FILM COATED ORAL
Qty: 6 TABLET | Refills: 0 | Status: SHIPPED | OUTPATIENT
Start: 2024-05-06 | End: 2024-05-16

## 2024-05-06 RX ADMIN — BENZONATATE 100 MG: 100 CAPSULE ORAL at 00:01

## 2024-05-06 ASSESSMENT — ENCOUNTER SYMPTOMS
COUGH: 1
SORE THROAT: 1
SHORTNESS OF BREATH: 1

## 2024-05-06 NOTE — ED PROVIDER NOTES
Positive for cough and shortness of breath.        PHYSICAL EXAM      INITIAL VITALS:   /82   Pulse 84   Temp 98.4 °F (36.9 °C)   Resp 17   SpO2 96%     Physical Exam  Vitals reviewed.   Constitutional:       Appearance: Normal appearance.   HENT:      Head: Normocephalic and atraumatic.      Nose: Nose normal.      Mouth/Throat:      Mouth: Mucous membranes are moist.      Pharynx: Oropharynx is clear.   Eyes:      Extraocular Movements: Extraocular movements intact.      Pupils: Pupils are equal, round, and reactive to light.   Cardiovascular:      Rate and Rhythm: Normal rate and regular rhythm.      Pulses: Normal pulses.      Heart sounds: Normal heart sounds.   Musculoskeletal:      Cervical back: Normal range of motion and neck supple.   Skin:     General: Skin is warm and dry.   Neurological:      Mental Status: She is alert.           DDX/DIAGNOSTIC RESULTS / EMERGENCY DEPARTMENT COURSE / MDM     Medical Decision Making  Patient is here with a cough has been ongoing for almost 1 month.  Will get a two-view chest x-ray in addition to giving treatment for the cough itself and reevaluate once the scans have been completed.    Amount and/or Complexity of Data Reviewed  Radiology: ordered.    Risk  OTC drugs.          EKG      All EKG's are interpreted by the Emergency Department Physician who either signs or Co-signs this chart in the absence of a cardiologist.    EMERGENCY DEPARTMENT COURSE:      ED Course as of 05/06/24 0029   Mon May 06, 2024   0029 Patient's x-ray shows that she does have some patchy haze that looks like a viral type pneumonia or atypical haziness.  Given that she has been unable to stop this cough for over a month, viral bronchitis would be a very high on the differential.  As such will antibiosis with a Z-Jacob and have her continue taking the over-the-counter meds as she was previously prescribed [ES]      ED Course User Index  [ES] Nima Fernández MD

## 2025-02-26 ENCOUNTER — DIRECT ADMIT ORDERS (OUTPATIENT)
Dept: INTERNAL MEDICINE CLINIC | Age: 45
End: 2025-02-26

## 2025-02-26 ENCOUNTER — HOSPITAL ENCOUNTER (EMERGENCY)
Age: 45
Discharge: ANOTHER HOSPITAL | End: 2025-02-27

## 2025-02-26 ENCOUNTER — APPOINTMENT (OUTPATIENT)
Dept: GENERAL RADIOLOGY | Age: 45
End: 2025-02-26

## 2025-02-26 DIAGNOSIS — I21.4 NSTEMI (NON-ST ELEVATED MYOCARDIAL INFARCTION) (HCC): Primary | ICD-10-CM

## 2025-02-26 LAB
ANION GAP SERPL CALCULATED.3IONS-SCNC: 9 MMOL/L (ref 9–16)
BASOPHILS # BLD: 0.04 K/UL (ref 0–0.2)
BASOPHILS NFR BLD: 0 % (ref 0–2)
BUN SERPL-MCNC: 9 MG/DL (ref 6–20)
BUN/CREAT SERPL: 11 (ref 9–20)
CALCIUM SERPL-MCNC: 9.4 MG/DL (ref 8.6–10.4)
CHLORIDE SERPL-SCNC: 102 MMOL/L (ref 98–107)
CO2 SERPL-SCNC: 28 MMOL/L (ref 20–31)
CREAT SERPL-MCNC: 0.8 MG/DL (ref 0.5–0.9)
D DIMER PPP FEU-MCNC: 0.49 UG/ML FEU (ref 0–0.59)
EKG ATRIAL RATE: 62 BPM
EKG P AXIS: 51 DEGREES
EKG P-R INTERVAL: 166 MS
EKG Q-T INTERVAL: 452 MS
EKG QRS DURATION: 84 MS
EKG QTC CALCULATION (BAZETT): 458 MS
EKG R AXIS: 46 DEGREES
EKG T AXIS: 50 DEGREES
EKG VENTRICULAR RATE: 62 BPM
EOSINOPHIL # BLD: 0.06 K/UL (ref 0–0.44)
EOSINOPHILS RELATIVE PERCENT: 1 % (ref 1–4)
ERYTHROCYTE [DISTWIDTH] IN BLOOD BY AUTOMATED COUNT: 13.1 % (ref 11.8–14.4)
FLUAV AG SPEC QL: NEGATIVE
FLUBV AG SPEC QL: NEGATIVE
GFR, ESTIMATED: >90 ML/MIN/1.73M2
GLUCOSE SERPL-MCNC: 109 MG/DL (ref 74–99)
HCT VFR BLD AUTO: 45.2 % (ref 36.3–47.1)
HGB BLD-MCNC: 15.2 G/DL (ref 11.9–15.1)
IMM GRANULOCYTES # BLD AUTO: 0.03 K/UL (ref 0–0.3)
IMM GRANULOCYTES NFR BLD: 0 %
INR PPP: 0.9
LYMPHOCYTES NFR BLD: 2.31 K/UL (ref 1.1–3.7)
LYMPHOCYTES RELATIVE PERCENT: 22 % (ref 24–43)
MCH RBC QN AUTO: 29.3 PG (ref 25.2–33.5)
MCHC RBC AUTO-ENTMCNC: 33.6 G/DL (ref 28.4–34.8)
MCV RBC AUTO: 87.3 FL (ref 82.6–102.9)
MONOCYTES NFR BLD: 0.84 K/UL (ref 0.1–1.2)
MONOCYTES NFR BLD: 8 % (ref 3–12)
NEUTROPHILS NFR BLD: 69 % (ref 36–65)
NEUTS SEG NFR BLD: 7.24 K/UL (ref 1.5–8.1)
NRBC BLD-RTO: 0 PER 100 WBC
PARTIAL THROMBOPLASTIN TIME: 26 SEC (ref 26.8–34.8)
PLATELET # BLD AUTO: 240 K/UL (ref 138–453)
PMV BLD AUTO: 10.8 FL (ref 8.1–13.5)
POTASSIUM SERPL-SCNC: 3.5 MMOL/L (ref 3.7–5.3)
PROTHROMBIN TIME: 12.1 SEC (ref 11.7–14.1)
RBC # BLD AUTO: 5.18 M/UL (ref 3.95–5.11)
SARS-COV-2 RDRP RESP QL NAA+PROBE: NOT DETECTED
SODIUM SERPL-SCNC: 139 MMOL/L (ref 136–145)
SPECIMEN DESCRIPTION: NORMAL
TROPONIN I SERPL HS-MCNC: 278 NG/L (ref 0–14)
TROPONIN I SERPL HS-MCNC: 313 NG/L (ref 0–14)
TROPONIN I SERPL HS-MCNC: 322 NG/L (ref 0–14)
WBC OTHER # BLD: 10.5 K/UL (ref 3.5–11.3)

## 2025-02-26 PROCEDURE — 96375 TX/PRO/DX INJ NEW DRUG ADDON: CPT

## 2025-02-26 PROCEDURE — 96365 THER/PROPH/DIAG IV INF INIT: CPT

## 2025-02-26 PROCEDURE — 99285 EMERGENCY DEPT VISIT HI MDM: CPT

## 2025-02-26 PROCEDURE — 93005 ELECTROCARDIOGRAM TRACING: CPT

## 2025-02-26 PROCEDURE — 85610 PROTHROMBIN TIME: CPT

## 2025-02-26 PROCEDURE — 87804 INFLUENZA ASSAY W/OPTIC: CPT

## 2025-02-26 PROCEDURE — 6360000002 HC RX W HCPCS: Performed by: PHYSICIAN ASSISTANT

## 2025-02-26 PROCEDURE — 36415 COLL VENOUS BLD VENIPUNCTURE: CPT

## 2025-02-26 PROCEDURE — 93010 ELECTROCARDIOGRAM REPORT: CPT | Performed by: INTERNAL MEDICINE

## 2025-02-26 PROCEDURE — 6370000000 HC RX 637 (ALT 250 FOR IP): Performed by: PHYSICIAN ASSISTANT

## 2025-02-26 PROCEDURE — 85730 THROMBOPLASTIN TIME PARTIAL: CPT

## 2025-02-26 PROCEDURE — 71045 X-RAY EXAM CHEST 1 VIEW: CPT

## 2025-02-26 PROCEDURE — 87635 SARS-COV-2 COVID-19 AMP PRB: CPT

## 2025-02-26 PROCEDURE — 96366 THER/PROPH/DIAG IV INF ADDON: CPT

## 2025-02-26 PROCEDURE — 80048 BASIC METABOLIC PNL TOTAL CA: CPT

## 2025-02-26 PROCEDURE — 84484 ASSAY OF TROPONIN QUANT: CPT

## 2025-02-26 PROCEDURE — 85379 FIBRIN DEGRADATION QUANT: CPT

## 2025-02-26 PROCEDURE — 85025 COMPLETE CBC W/AUTO DIFF WBC: CPT

## 2025-02-26 RX ORDER — POTASSIUM CHLORIDE 1500 MG/1
40 TABLET, EXTENDED RELEASE ORAL PRN
OUTPATIENT
Start: 2025-02-26

## 2025-02-26 RX ORDER — HEPARIN SODIUM 10000 [USP'U]/100ML
5-30 INJECTION, SOLUTION INTRAVENOUS CONTINUOUS
OUTPATIENT
Start: 2025-02-26

## 2025-02-26 RX ORDER — SODIUM CHLORIDE 0.9 % (FLUSH) 0.9 %
10 SYRINGE (ML) INJECTION PRN
OUTPATIENT
Start: 2025-02-26

## 2025-02-26 RX ORDER — NITROGLYCERIN 0.4 MG/1
0.4 TABLET SUBLINGUAL EVERY 5 MIN PRN
Status: DISCONTINUED | OUTPATIENT
Start: 2025-02-26 | End: 2025-02-27 | Stop reason: HOSPADM

## 2025-02-26 RX ORDER — ACETAMINOPHEN 325 MG/1
650 TABLET ORAL EVERY 6 HOURS PRN
OUTPATIENT
Start: 2025-02-26

## 2025-02-26 RX ORDER — ASPIRIN 325 MG
325 TABLET ORAL ONCE
Status: COMPLETED | OUTPATIENT
Start: 2025-02-26 | End: 2025-02-26

## 2025-02-26 RX ORDER — ONDANSETRON 4 MG/1
4 TABLET, ORALLY DISINTEGRATING ORAL EVERY 8 HOURS PRN
OUTPATIENT
Start: 2025-02-26

## 2025-02-26 RX ORDER — ACETAMINOPHEN 650 MG/1
650 SUPPOSITORY RECTAL EVERY 6 HOURS PRN
OUTPATIENT
Start: 2025-02-26

## 2025-02-26 RX ORDER — HEPARIN SODIUM 10000 [USP'U]/100ML
5-30 INJECTION, SOLUTION INTRAVENOUS CONTINUOUS
Status: DISCONTINUED | OUTPATIENT
Start: 2025-02-26 | End: 2025-02-27 | Stop reason: HOSPADM

## 2025-02-26 RX ORDER — SODIUM CHLORIDE 9 MG/ML
INJECTION, SOLUTION INTRAVENOUS PRN
OUTPATIENT
Start: 2025-02-26

## 2025-02-26 RX ORDER — MAGNESIUM SULFATE 1 G/100ML
1000 INJECTION INTRAVENOUS PRN
OUTPATIENT
Start: 2025-02-26

## 2025-02-26 RX ORDER — HEPARIN SODIUM 1000 [USP'U]/ML
30 INJECTION, SOLUTION INTRAVENOUS; SUBCUTANEOUS PRN
OUTPATIENT
Start: 2025-02-26

## 2025-02-26 RX ORDER — ONDANSETRON 2 MG/ML
4 INJECTION INTRAMUSCULAR; INTRAVENOUS EVERY 6 HOURS PRN
OUTPATIENT
Start: 2025-02-26

## 2025-02-26 RX ORDER — ASPIRIN 81 MG/1
81 TABLET, CHEWABLE ORAL DAILY
OUTPATIENT
Start: 2025-02-27

## 2025-02-26 RX ORDER — ATORVASTATIN CALCIUM 80 MG/1
80 TABLET, FILM COATED ORAL NIGHTLY
OUTPATIENT
Start: 2025-02-26

## 2025-02-26 RX ORDER — HEPARIN SODIUM 1000 [USP'U]/ML
60 INJECTION, SOLUTION INTRAVENOUS; SUBCUTANEOUS PRN
OUTPATIENT
Start: 2025-02-26

## 2025-02-26 RX ORDER — NITROGLYCERIN 0.4 MG/1
0.4 TABLET SUBLINGUAL EVERY 5 MIN PRN
OUTPATIENT
Start: 2025-02-26

## 2025-02-26 RX ORDER — POTASSIUM CHLORIDE 7.45 MG/ML
10 INJECTION INTRAVENOUS PRN
OUTPATIENT
Start: 2025-02-26

## 2025-02-26 RX ORDER — HEPARIN SODIUM 1000 [USP'U]/ML
4000 INJECTION, SOLUTION INTRAVENOUS; SUBCUTANEOUS PRN
Status: DISCONTINUED | OUTPATIENT
Start: 2025-02-26 | End: 2025-02-27 | Stop reason: HOSPADM

## 2025-02-26 RX ORDER — ONDANSETRON 2 MG/ML
4 INJECTION INTRAMUSCULAR; INTRAVENOUS ONCE
Status: COMPLETED | OUTPATIENT
Start: 2025-02-26 | End: 2025-02-26

## 2025-02-26 RX ORDER — HEPARIN SODIUM 1000 [USP'U]/ML
2000 INJECTION, SOLUTION INTRAVENOUS; SUBCUTANEOUS PRN
Status: DISCONTINUED | OUTPATIENT
Start: 2025-02-26 | End: 2025-02-27 | Stop reason: HOSPADM

## 2025-02-26 RX ORDER — HEPARIN SODIUM 1000 [USP'U]/ML
4000 INJECTION, SOLUTION INTRAVENOUS; SUBCUTANEOUS ONCE
Status: COMPLETED | OUTPATIENT
Start: 2025-02-26 | End: 2025-02-26

## 2025-02-26 RX ORDER — SODIUM CHLORIDE 0.9 % (FLUSH) 0.9 %
5-40 SYRINGE (ML) INJECTION EVERY 12 HOURS SCHEDULED
OUTPATIENT
Start: 2025-02-26

## 2025-02-26 RX ADMIN — HEPARIN SODIUM 10 UNITS/KG/HR: 10000 INJECTION, SOLUTION INTRAVENOUS at 20:12

## 2025-02-26 RX ADMIN — NITROGLYCERIN 0.4 MG: 0.4 TABLET SUBLINGUAL at 20:26

## 2025-02-26 RX ADMIN — ONDANSETRON 4 MG: 2 INJECTION, SOLUTION INTRAMUSCULAR; INTRAVENOUS at 18:42

## 2025-02-26 RX ADMIN — NITROGLYCERIN 0.4 MG: 0.4 TABLET SUBLINGUAL at 18:59

## 2025-02-26 RX ADMIN — NITROGLYCERIN 0.4 MG: 0.4 TABLET SUBLINGUAL at 18:42

## 2025-02-26 RX ADMIN — HEPARIN SODIUM 4000 UNITS: 1000 INJECTION INTRAVENOUS; SUBCUTANEOUS at 20:11

## 2025-02-26 RX ADMIN — ASPIRIN 325 MG: 325 TABLET ORAL at 18:43

## 2025-02-26 ASSESSMENT — LIFESTYLE VARIABLES
HOW OFTEN DO YOU HAVE A DRINK CONTAINING ALCOHOL: NEVER
HOW MANY STANDARD DRINKS CONTAINING ALCOHOL DO YOU HAVE ON A TYPICAL DAY: PATIENT DOES NOT DRINK

## 2025-02-26 ASSESSMENT — PAIN SCALES - GENERAL
PAINLEVEL_OUTOF10: 7
PAINLEVEL_OUTOF10: 8

## 2025-02-26 ASSESSMENT — PAIN DESCRIPTION - LOCATION
LOCATION: CHEST
LOCATION: CHEST

## 2025-02-26 ASSESSMENT — PAIN DESCRIPTION - DESCRIPTORS: DESCRIPTORS: BURNING

## 2025-02-26 ASSESSMENT — PAIN DESCRIPTION - ORIENTATION: ORIENTATION: MID

## 2025-02-26 ASSESSMENT — PAIN - FUNCTIONAL ASSESSMENT: PAIN_FUNCTIONAL_ASSESSMENT: 0-10

## 2025-02-26 ASSESSMENT — PAIN DESCRIPTION - PAIN TYPE: TYPE: ACUTE PAIN

## 2025-02-26 NOTE — ED PROVIDER NOTES
Emergency Department Encounter    Note to patient: The 21st Century Cures Act requires that medical notes like this one to be available to patients in the interest of transparency. Please be advised, this is a medical document. It is intended as ixca-yu-wmos communication. It is written in medical language and may contain abbreviations and verbiage that may be unfamiliar. It may appear to read blunt or direct. Medical documents are intended to carry relevant medical information, facts as evident and the clinical opinion of the practitioner.      Chief Complaint  Chief Complaint   Patient presents with    Chest Pain     Pt. Reports chest pain starting last night and into today. Pt. States mid chest pain with no radiation. Pt. Reports taking tums earlier today with x1 episode of emesis after with no relief        HPI  This 44-year-old female states she has had chest pain since yesterday afternoon pressure symptoms pushing her chest symptoms burning in nature she states it kept her awake most the night waxing and waning in intensity she tried to take Tums thinking that it was related to heartburn but she vomited the Tums.  Patient has a history of a thoracic aneurysm repair at the [facility] in 2016, she is hypertensive but has been noncompliant with her blood pressure medication and daily aspirin since losing her insurance a year ago.  Patient is a daily smoker she denies any other modifying factors for her discomfort states is not pleuritic in nature.  She is chronically short of breath she thinks due to her smoking but she states it is a little worse today           Past Medical History  Past Medical History:   Diagnosis Date    COVID-19 11/13/2021    GERD (gastroesophageal reflux disease)     Hypertension     Thoracic aortic aneurysm     s/p repair        Surgical History  Past Surgical History:   Procedure Laterality Date    CARDIAC SURGERY      SOFT TISSUE BIOPSY      THORACIC AORTIC ANEURYSM REPAIR

## 2025-02-27 ENCOUNTER — APPOINTMENT (OUTPATIENT)
Dept: GENERAL RADIOLOGY | Age: 45
DRG: 322 | End: 2025-02-27

## 2025-02-27 ENCOUNTER — HOSPITAL ENCOUNTER (INPATIENT)
Age: 45
LOS: 1 days | Discharge: HOME | DRG: 322 | End: 2025-02-28
Attending: EMERGENCY MEDICINE

## 2025-02-27 VITALS
RESPIRATION RATE: 16 BRPM | BODY MASS INDEX: 36.7 KG/M2 | DIASTOLIC BLOOD PRESSURE: 78 MMHG | OXYGEN SATURATION: 97 % | HEIGHT: 64 IN | WEIGHT: 215 LBS | SYSTOLIC BLOOD PRESSURE: 108 MMHG | HEART RATE: 63 BPM | TEMPERATURE: 98.3 F

## 2025-02-27 DIAGNOSIS — I20.9 ANGINA PECTORIS: ICD-10-CM

## 2025-02-27 DIAGNOSIS — I21.4 NSTEMI (NON-ST ELEVATED MYOCARDIAL INFARCTION) (HCC): Primary | ICD-10-CM

## 2025-02-27 DIAGNOSIS — I24.9 ACUTE CORONARY SYNDROME (HCC): ICD-10-CM

## 2025-02-27 DIAGNOSIS — Z95.5 STATUS POST CORONARY ARTERY STENT PLACEMENT: ICD-10-CM

## 2025-02-27 PROBLEM — Q23.81 BICUSPID AORTIC VALVE: Status: ACTIVE | Noted: 2025-02-27

## 2025-02-27 LAB
ALBUMIN SERPL-MCNC: 3.6 G/DL (ref 3.5–5.2)
ALBUMIN/GLOB SERPL: 1.4 {RATIO} (ref 1–2.5)
ALP SERPL-CCNC: 111 U/L (ref 35–104)
ALT SERPL-CCNC: 21 U/L (ref 10–35)
ANION GAP SERPL CALCULATED.3IONS-SCNC: 10 MMOL/L (ref 9–16)
ANTI-XA UNFRAC HEPARIN: <0.1 IU/L
AST SERPL-CCNC: 132 U/L (ref 10–35)
BILIRUB SERPL-MCNC: 0.4 MG/DL (ref 0–1.2)
BUN SERPL-MCNC: 9 MG/DL (ref 6–20)
CALCIUM SERPL-MCNC: 9 MG/DL (ref 8.6–10.4)
CHLORIDE SERPL-SCNC: 104 MMOL/L (ref 98–107)
CO2 SERPL-SCNC: 24 MMOL/L (ref 20–31)
CREAT SERPL-MCNC: 0.8 MG/DL (ref 0.6–0.9)
EKG ATRIAL RATE: 53 BPM
EKG ATRIAL RATE: 57 BPM
EKG P AXIS: 50 DEGREES
EKG P AXIS: 54 DEGREES
EKG P-R INTERVAL: 176 MS
EKG P-R INTERVAL: 204 MS
EKG Q-T INTERVAL: 460 MS
EKG Q-T INTERVAL: 506 MS
EKG QRS DURATION: 82 MS
EKG QRS DURATION: 86 MS
EKG QTC CALCULATION (BAZETT): 447 MS
EKG QTC CALCULATION (BAZETT): 474 MS
EKG R AXIS: 29 DEGREES
EKG R AXIS: 54 DEGREES
EKG T AXIS: 77 DEGREES
EKG T AXIS: 84 DEGREES
EKG VENTRICULAR RATE: 53 BPM
EKG VENTRICULAR RATE: 57 BPM
ERYTHROCYTE [DISTWIDTH] IN BLOOD BY AUTOMATED COUNT: 13.4 % (ref 11.8–14.4)
GFR, ESTIMATED: >90 ML/MIN/1.73M2
GLUCOSE SERPL-MCNC: 106 MG/DL (ref 74–99)
HCT VFR BLD AUTO: 39.8 % (ref 36.3–47.1)
HGB BLD-MCNC: 13.2 G/DL (ref 11.9–15.1)
INR PPP: 1
MCH RBC QN AUTO: 28.6 PG (ref 25.2–33.5)
MCHC RBC AUTO-ENTMCNC: 33.2 G/DL (ref 28.4–34.8)
MCV RBC AUTO: 86.1 FL (ref 82.6–102.9)
NRBC BLD-RTO: 0 PER 100 WBC
PARTIAL THROMBOPLASTIN TIME: 116.2 SEC (ref 26.8–34.8)
PARTIAL THROMBOPLASTIN TIME: 26.7 SEC (ref 23–36.5)
PARTIAL THROMBOPLASTIN TIME: 27.7 SEC (ref 26.8–34.8)
PLATELET # BLD AUTO: 195 K/UL (ref 138–453)
PMV BLD AUTO: 10.8 FL (ref 8.1–13.5)
POTASSIUM SERPL-SCNC: 3.7 MMOL/L (ref 3.7–5.3)
PROT SERPL-MCNC: 6.2 G/DL (ref 6.6–8.7)
PROTHROMBIN TIME: 12.8 SEC (ref 11.7–14.9)
RBC # BLD AUTO: 4.62 M/UL (ref 3.95–5.11)
SODIUM SERPL-SCNC: 138 MMOL/L (ref 136–145)
TROPONIN I SERPL HS-MCNC: 564 NG/L (ref 0–14)
TROPONIN I SERPL HS-MCNC: 864 NG/L (ref 0–14)
TROPONIN I SERPL HS-MCNC: 912 NG/L (ref 0–14)
TROPONIN I SERPL HS-MCNC: 944 NG/L (ref 0–14)
TROPONIN I SERPL HS-MCNC: 964 NG/L (ref 0–14)
WBC OTHER # BLD: 9.6 K/UL (ref 3.5–11.3)

## 2025-02-27 PROCEDURE — 93005 ELECTROCARDIOGRAM TRACING: CPT

## 2025-02-27 PROCEDURE — 7100000010 HC PHASE II RECOVERY - FIRST 15 MIN: Performed by: INTERNAL MEDICINE

## 2025-02-27 PROCEDURE — C1894 INTRO/SHEATH, NON-LASER: HCPCS | Performed by: INTERNAL MEDICINE

## 2025-02-27 PROCEDURE — 1200000000 HC SEMI PRIVATE

## 2025-02-27 PROCEDURE — 93005 ELECTROCARDIOGRAM TRACING: CPT | Performed by: NURSE PRACTITIONER

## 2025-02-27 PROCEDURE — C9600 PERC DRUG-EL COR STENT SING: HCPCS | Performed by: INTERNAL MEDICINE

## 2025-02-27 PROCEDURE — 84484 ASSAY OF TROPONIN QUANT: CPT

## 2025-02-27 PROCEDURE — 027034Z DILATION OF CORONARY ARTERY, ONE ARTERY WITH DRUG-ELUTING INTRALUMINAL DEVICE, PERCUTANEOUS APPROACH: ICD-10-PCS | Performed by: INTERNAL MEDICINE

## 2025-02-27 PROCEDURE — 99152 MOD SED SAME PHYS/QHP 5/>YRS: CPT | Performed by: INTERNAL MEDICINE

## 2025-02-27 PROCEDURE — 2580000003 HC RX 258: Performed by: INTERNAL MEDICINE

## 2025-02-27 PROCEDURE — C1725 CATH, TRANSLUMIN NON-LASER: HCPCS | Performed by: INTERNAL MEDICINE

## 2025-02-27 PROCEDURE — C1892 INTRO/SHEATH,FIXED,PEEL-AWAY: HCPCS | Performed by: INTERNAL MEDICINE

## 2025-02-27 PROCEDURE — 6360000004 HC RX CONTRAST MEDICATION: Performed by: INTERNAL MEDICINE

## 2025-02-27 PROCEDURE — 99285 EMERGENCY DEPT VISIT HI MDM: CPT

## 2025-02-27 PROCEDURE — 4A023N7 MEASUREMENT OF CARDIAC SAMPLING AND PRESSURE, LEFT HEART, PERCUTANEOUS APPROACH: ICD-10-PCS | Performed by: INTERNAL MEDICINE

## 2025-02-27 PROCEDURE — 80053 COMPREHEN METABOLIC PANEL: CPT

## 2025-02-27 PROCEDURE — 99153 MOD SED SAME PHYS/QHP EA: CPT | Performed by: INTERNAL MEDICINE

## 2025-02-27 PROCEDURE — 6370000000 HC RX 637 (ALT 250 FOR IP): Performed by: INTERNAL MEDICINE

## 2025-02-27 PROCEDURE — 71045 X-RAY EXAM CHEST 1 VIEW: CPT

## 2025-02-27 PROCEDURE — 99222 1ST HOSP IP/OBS MODERATE 55: CPT | Performed by: NURSE PRACTITIONER

## 2025-02-27 PROCEDURE — 7100000010 HC PHASE II RECOVERY - FIRST 15 MIN

## 2025-02-27 PROCEDURE — C1769 GUIDE WIRE: HCPCS | Performed by: INTERNAL MEDICINE

## 2025-02-27 PROCEDURE — 85027 COMPLETE CBC AUTOMATED: CPT

## 2025-02-27 PROCEDURE — 2500000003 HC RX 250 WO HCPCS: Performed by: NURSE PRACTITIONER

## 2025-02-27 PROCEDURE — 96366 THER/PROPH/DIAG IV INF ADDON: CPT

## 2025-02-27 PROCEDURE — 6360000002 HC RX W HCPCS: Performed by: INTERNAL MEDICINE

## 2025-02-27 PROCEDURE — B2111ZZ FLUOROSCOPY OF MULTIPLE CORONARY ARTERIES USING LOW OSMOLAR CONTRAST: ICD-10-PCS | Performed by: INTERNAL MEDICINE

## 2025-02-27 PROCEDURE — 96374 THER/PROPH/DIAG INJ IV PUSH: CPT

## 2025-02-27 PROCEDURE — 93458 L HRT ARTERY/VENTRICLE ANGIO: CPT | Performed by: INTERNAL MEDICINE

## 2025-02-27 PROCEDURE — C1874 STENT, COATED/COV W/DEL SYS: HCPCS | Performed by: INTERNAL MEDICINE

## 2025-02-27 PROCEDURE — 6370000000 HC RX 637 (ALT 250 FOR IP): Performed by: STUDENT IN AN ORGANIZED HEALTH CARE EDUCATION/TRAINING PROGRAM

## 2025-02-27 PROCEDURE — 6360000002 HC RX W HCPCS

## 2025-02-27 PROCEDURE — 2580000003 HC RX 258: Performed by: NURSE PRACTITIONER

## 2025-02-27 PROCEDURE — 6370000000 HC RX 637 (ALT 250 FOR IP): Performed by: NURSE PRACTITIONER

## 2025-02-27 PROCEDURE — C1887 CATHETER, GUIDING: HCPCS | Performed by: INTERNAL MEDICINE

## 2025-02-27 PROCEDURE — 2709999900 HC NON-CHARGEABLE SUPPLY: Performed by: INTERNAL MEDICINE

## 2025-02-27 PROCEDURE — 7100000011 HC PHASE II RECOVERY - ADDTL 15 MIN

## 2025-02-27 PROCEDURE — 7100000011 HC PHASE II RECOVERY - ADDTL 15 MIN: Performed by: INTERNAL MEDICINE

## 2025-02-27 PROCEDURE — 85730 THROMBOPLASTIN TIME PARTIAL: CPT

## 2025-02-27 PROCEDURE — 85610 PROTHROMBIN TIME: CPT

## 2025-02-27 PROCEDURE — 96376 TX/PRO/DX INJ SAME DRUG ADON: CPT

## 2025-02-27 PROCEDURE — C1760 CLOSURE DEV, VASC: HCPCS | Performed by: INTERNAL MEDICINE

## 2025-02-27 PROCEDURE — 2060000000 HC ICU INTERMEDIATE R&B

## 2025-02-27 PROCEDURE — 85520 HEPARIN ASSAY: CPT

## 2025-02-27 PROCEDURE — 6370000000 HC RX 637 (ALT 250 FOR IP)

## 2025-02-27 DEVICE — STENT ONYXNG35026UX ONYX 3.50X26RX
Type: IMPLANTABLE DEVICE | Status: FUNCTIONAL
Brand: ONYX FRONTIER™

## 2025-02-27 RX ORDER — ACETAMINOPHEN 650 MG/1
650 SUPPOSITORY RECTAL EVERY 6 HOURS PRN
Status: DISCONTINUED | OUTPATIENT
Start: 2025-02-27 | End: 2025-02-28 | Stop reason: HOSPADM

## 2025-02-27 RX ORDER — POTASSIUM CHLORIDE 1500 MG/1
40 TABLET, EXTENDED RELEASE ORAL PRN
Status: DISCONTINUED | OUTPATIENT
Start: 2025-02-27 | End: 2025-02-28 | Stop reason: HOSPADM

## 2025-02-27 RX ORDER — BIVALIRUDIN 250 MG/5ML
INJECTION, POWDER, LYOPHILIZED, FOR SOLUTION INTRAVENOUS PRN
Status: DISCONTINUED | OUTPATIENT
Start: 2025-02-27 | End: 2025-02-27 | Stop reason: HOSPADM

## 2025-02-27 RX ORDER — FENTANYL CITRATE 50 UG/ML
INJECTION, SOLUTION INTRAMUSCULAR; INTRAVENOUS PRN
Status: DISCONTINUED | OUTPATIENT
Start: 2025-02-27 | End: 2025-02-27 | Stop reason: HOSPADM

## 2025-02-27 RX ORDER — ENOXAPARIN SODIUM 100 MG/ML
40 INJECTION SUBCUTANEOUS DAILY
Status: DISCONTINUED | OUTPATIENT
Start: 2025-02-28 | End: 2025-02-28 | Stop reason: HOSPADM

## 2025-02-27 RX ORDER — ATORVASTATIN CALCIUM 80 MG/1
40 TABLET, FILM COATED ORAL NIGHTLY
Status: DISCONTINUED | OUTPATIENT
Start: 2025-02-27 | End: 2025-02-27

## 2025-02-27 RX ORDER — SODIUM CHLORIDE 0.9 % (FLUSH) 0.9 %
10 SYRINGE (ML) INJECTION PRN
Status: DISCONTINUED | OUTPATIENT
Start: 2025-02-27 | End: 2025-02-28 | Stop reason: HOSPADM

## 2025-02-27 RX ORDER — HEPARIN SODIUM 10000 [USP'U]/100ML
5-30 INJECTION, SOLUTION INTRAVENOUS CONTINUOUS
Status: DISCONTINUED | OUTPATIENT
Start: 2025-02-27 | End: 2025-02-27

## 2025-02-27 RX ORDER — ASPIRIN 81 MG/1
81 TABLET ORAL DAILY
Status: DISCONTINUED | OUTPATIENT
Start: 2025-02-27 | End: 2025-02-28 | Stop reason: HOSPADM

## 2025-02-27 RX ORDER — ASPIRIN 81 MG/1
TABLET, CHEWABLE ORAL PRN
Status: DISCONTINUED | OUTPATIENT
Start: 2025-02-27 | End: 2025-02-27 | Stop reason: HOSPADM

## 2025-02-27 RX ORDER — IOPAMIDOL 755 MG/ML
INJECTION, SOLUTION INTRAVASCULAR PRN
Status: DISCONTINUED | OUTPATIENT
Start: 2025-02-27 | End: 2025-02-27 | Stop reason: HOSPADM

## 2025-02-27 RX ORDER — HEPARIN SODIUM 1000 [USP'U]/ML
4000 INJECTION, SOLUTION INTRAVENOUS; SUBCUTANEOUS PRN
Status: DISCONTINUED | OUTPATIENT
Start: 2025-02-27 | End: 2025-02-27

## 2025-02-27 RX ORDER — METOPROLOL TARTRATE 25 MG/1
12.5 TABLET, FILM COATED ORAL 2 TIMES DAILY
Status: DISCONTINUED | OUTPATIENT
Start: 2025-02-27 | End: 2025-02-28 | Stop reason: HOSPADM

## 2025-02-27 RX ORDER — ACETAMINOPHEN 325 MG/1
650 TABLET ORAL ONCE
Status: COMPLETED | OUTPATIENT
Start: 2025-02-27 | End: 2025-02-27

## 2025-02-27 RX ORDER — ONDANSETRON 4 MG/1
4 TABLET, ORALLY DISINTEGRATING ORAL EVERY 8 HOURS PRN
Status: DISCONTINUED | OUTPATIENT
Start: 2025-02-27 | End: 2025-02-28 | Stop reason: HOSPADM

## 2025-02-27 RX ORDER — POTASSIUM CHLORIDE 7.45 MG/ML
10 INJECTION INTRAVENOUS PRN
Status: DISCONTINUED | OUTPATIENT
Start: 2025-02-27 | End: 2025-02-28 | Stop reason: HOSPADM

## 2025-02-27 RX ORDER — ATORVASTATIN CALCIUM 80 MG/1
80 TABLET, FILM COATED ORAL NIGHTLY
Status: DISCONTINUED | OUTPATIENT
Start: 2025-02-27 | End: 2025-02-28 | Stop reason: HOSPADM

## 2025-02-27 RX ORDER — SODIUM CHLORIDE 9 MG/ML
INJECTION, SOLUTION INTRAVENOUS PRN
Status: DISCONTINUED | OUTPATIENT
Start: 2025-02-27 | End: 2025-02-28 | Stop reason: HOSPADM

## 2025-02-27 RX ORDER — NITROGLYCERIN 20 MG/100ML
INJECTION INTRAVENOUS PRN
Status: DISCONTINUED | OUTPATIENT
Start: 2025-02-27 | End: 2025-02-27 | Stop reason: HOSPADM

## 2025-02-27 RX ORDER — SODIUM CHLORIDE 9 MG/ML
INJECTION, SOLUTION INTRAVENOUS CONTINUOUS
Status: ACTIVE | OUTPATIENT
Start: 2025-02-27 | End: 2025-02-27

## 2025-02-27 RX ORDER — SODIUM CHLORIDE 0.9 % (FLUSH) 0.9 %
5-40 SYRINGE (ML) INJECTION EVERY 12 HOURS SCHEDULED
Status: DISCONTINUED | OUTPATIENT
Start: 2025-02-27 | End: 2025-02-28 | Stop reason: HOSPADM

## 2025-02-27 RX ORDER — ACETAMINOPHEN 325 MG/1
650 TABLET ORAL EVERY 6 HOURS PRN
Status: DISCONTINUED | OUTPATIENT
Start: 2025-02-27 | End: 2025-02-28 | Stop reason: HOSPADM

## 2025-02-27 RX ORDER — MIDAZOLAM HYDROCHLORIDE 1 MG/ML
INJECTION, SOLUTION INTRAMUSCULAR; INTRAVENOUS PRN
Status: DISCONTINUED | OUTPATIENT
Start: 2025-02-27 | End: 2025-02-27 | Stop reason: HOSPADM

## 2025-02-27 RX ORDER — ONDANSETRON 2 MG/ML
4 INJECTION INTRAMUSCULAR; INTRAVENOUS EVERY 6 HOURS PRN
Status: DISCONTINUED | OUTPATIENT
Start: 2025-02-27 | End: 2025-02-28 | Stop reason: HOSPADM

## 2025-02-27 RX ORDER — HEPARIN SODIUM 1000 [USP'U]/ML
2000 INJECTION, SOLUTION INTRAVENOUS; SUBCUTANEOUS PRN
Status: DISCONTINUED | OUTPATIENT
Start: 2025-02-27 | End: 2025-02-27

## 2025-02-27 RX ORDER — MAGNESIUM SULFATE 1 G/100ML
1000 INJECTION INTRAVENOUS PRN
Status: DISCONTINUED | OUTPATIENT
Start: 2025-02-27 | End: 2025-02-28 | Stop reason: HOSPADM

## 2025-02-27 RX ORDER — METOPROLOL TARTRATE 50 MG
25 TABLET ORAL 2 TIMES DAILY
Status: DISCONTINUED | OUTPATIENT
Start: 2025-02-27 | End: 2025-02-27

## 2025-02-27 RX ADMIN — ATORVASTATIN CALCIUM 80 MG: 80 TABLET, FILM COATED ORAL at 21:47

## 2025-02-27 RX ADMIN — ACETAMINOPHEN 650 MG: 325 TABLET ORAL at 01:46

## 2025-02-27 RX ADMIN — SODIUM CHLORIDE, PRESERVATIVE FREE 10 ML: 5 INJECTION INTRAVENOUS at 21:47

## 2025-02-27 RX ADMIN — HEPARIN SODIUM 4000 UNITS: 1000 INJECTION INTRAVENOUS; SUBCUTANEOUS at 11:33

## 2025-02-27 RX ADMIN — SODIUM CHLORIDE: 9 INJECTION, SOLUTION INTRAVENOUS at 13:28

## 2025-02-27 RX ADMIN — TICAGRELOR 90 MG: 90 TABLET ORAL at 21:47

## 2025-02-27 RX ADMIN — METOPROLOL TARTRATE 12.5 MG: 25 TABLET, FILM COATED ORAL at 21:47

## 2025-02-27 RX ADMIN — HEPARIN SODIUM 7 UNITS/KG/HR: 10000 INJECTION, SOLUTION INTRAVENOUS at 10:21

## 2025-02-27 ASSESSMENT — PAIN - FUNCTIONAL ASSESSMENT
PAIN_FUNCTIONAL_ASSESSMENT: 0-10
PAIN_FUNCTIONAL_ASSESSMENT: 0-10

## 2025-02-27 ASSESSMENT — ENCOUNTER SYMPTOMS
WHEEZING: 0
VOMITING: 1
NAUSEA: 1
VOMITING: 0
SHORTNESS OF BREATH: 0
ABDOMINAL PAIN: 0
CHEST TIGHTNESS: 1

## 2025-02-27 ASSESSMENT — PAIN DESCRIPTION - DESCRIPTORS
DESCRIPTORS: TIGHTNESS
DESCRIPTORS: TIGHTNESS

## 2025-02-27 ASSESSMENT — PAIN DESCRIPTION - LOCATION
LOCATION: CHEST
LOCATION: CHEST

## 2025-02-27 ASSESSMENT — PAIN SCALES - GENERAL
PAINLEVEL_OUTOF10: 4
PAINLEVEL_OUTOF10: 4

## 2025-02-27 ASSESSMENT — PAIN DESCRIPTION - FREQUENCY: FREQUENCY: CONTINUOUS

## 2025-02-27 ASSESSMENT — PAIN DESCRIPTION - PAIN TYPE: TYPE: ACUTE PAIN

## 2025-02-27 ASSESSMENT — PAIN DESCRIPTION - ORIENTATION: ORIENTATION: ANTERIOR

## 2025-02-27 NOTE — CONSULTS
[facility]    Consult/H&P               Today's Date: 2/27/2025  Patient Name: Vaishali Mccarty  Date of admission: 2/27/2025 10:16 AM  Patient's age: 44 y.o., 1980  Admission Dx: NSTEMI (non-ST elevated myocardial infarction) ([facility]) [I21.4]    History Obtained From: patient/chart review     History of Present Illness:    This patient 44 y.o. years old female with past medical history as below.     Patient presented to OSH with chest pain radiating to the left arm for 2 days. She was found to have elevated troponins and then uptrended to 900s concerning for NSTEMI type I. She was transferred here for further care. Cardiology has been called for evaluation as well. She reports some improvement with SL nitro but reports that chest tightness is still there    Past Medical History:   has a past medical history of COVID-19, GERD (gastroesophageal reflux disease), Hypertension, and Thoracic aortic aneurysm.    Past Surgical History:   has a past surgical history that includes Soft Tissue Biopsy; Thoracic aortic aneurysm repair; and Cardiac surgery.     Home Medications:    Prior to Admission medications    Medication Sig Start Date End Date Taking? Authorizing Provider   metoprolol succinate (TOPROL XL) 50 MG extended release tablet Take 1 tablet by mouth daily 7/24/23   Stefani Sol PA-C   atorvastatin (LIPITOR) 40 MG tablet Take 1 tablet by mouth daily 7/24/23   Stefani Sol PA-C   chlorthalidone (HYGROTON) 25 MG tablet Take 1 tablet by mouth daily 5/31/23   Deondre Gonzales MD   albuterol sulfate HFA (PROAIR HFA) 108 (90 Base) MCG/ACT inhaler Inhale 2 puffs into the lungs every 6 hours as needed for Wheezing 3/19/23   Peyton Rowell MD   Aspirin-Acetaminophen-Caffeine (EXCEDRIN MIGRAINE PO) Take by mouth    Deondre Gonzales MD   tiZANidine (ZANAFLEX) 4 MG tablet Take 1 tablet by mouth every 8 hours as needed (Back pain) 7/13/21   Song Montague MD   ibuprofen

## 2025-02-27 NOTE — ED PROVIDER NOTES
[facility] EMERGENCY DEPARTMENT  EMERGENCY DEPARTMENT ENCOUNTER      Pt Name: Vaishali Mccarty  MRN: [id number]  Birthdate 1980  Date of evaluation: 2/26/2025  Provider: Peyton Rowell MD    CHIEF COMPLAINT       Chief Complaint   Patient presents with    Chest Pain     Pt. Reports chest pain starting last night and into today. Pt. States mid chest pain with no radiation. Pt. Reports taking tums earlier today with x1 episode of emesis after with no relief         HISTORY OF PRESENT ILLNESS   (Location/Symptom, Timing/Onset, Context/Setting, Quality, Duration, Modifying Factors, Severity)  Note limiting factors.   Vaishali Mccarty is a 44 y.o. female who presents to the emergency department      Signout received from Dr. Wright.  Please refer to her dictation for complete history of present illness review of systems physical exam findings and plan of care.  Patient is in NSTEMI awaiting of bed assignment at [facility] patient has been accepted by the hospitalist service there.  Repeat labs show continued increase in troponin most recent troponin is 864.  Patient is currently on a heparin drip.  States she has only minimal chest tightness at this time.  Still awaiting a bed and [facility] is still discharge dependent.  Spoke with Dr. Medeiros in the emergency department at [facility] who requested that I speak with cardiology and if the plan is to perform a heart catheterization today they would be able to separate as an ER to ER transfer.  I did speak with Dr. Joseph cardiology on-call at [facility].  Dr. Joseph did recommend transfer directly to the emergency department at [facility].  Patient was updated on plan of care.  She continues to rest comfortably and is stable at this time.        FINAL IMPRESSION      1. NSTEMI (non-ST elevated myocardial infarction) (HCC)          DISPOSITION/PLAN   DISPOSITION Decision To Transfer 02/26/2025 07:31:33 PM      PATIENT REFERRED TO:  No follow-up provider

## 2025-02-27 NOTE — ED NOTES
Troponin 800s  278 -800    Accepted for admission already.    Recommended to discuss with cardiology. Will accept to ED for urgent/emergent procedure if cardiology is requesting.     Vaishali Joseph would like ED TO ED FOR CATH    Accepted by Dr. Medeiros

## 2025-02-27 NOTE — ED NOTES
Pt presents to the ED as an emergent transfer from [facility] for elevated troponin.   Pt states she started having chest pain two days ago and states she felt heavy pressure in her chest.   Pt states she stayed at home to see if it improved and when it didn't, patient went to the ED around 1800 the next day.   Pt is alert and oriented x4, initial troponin were  in the 30's, that started to double.   Per [place] note:   \"NSTEMI awaiting of bed assignment at [facility] patient has been accepted by the hospitalist service there. Repeat labs show continued increase in troponin most recent troponin is 864. Patient is currently on a heparin drip. States she has only minimal chest tightness at this time. Still awaiting a bed and [facility] is still discharge dependent. Spoke with Dr. Medeiros in the emergency department at [facility] who requested that I speak with cardiology and if the plan is to perform a heart catheterization today they would be able to separate as an ER to ER transfer. I did speak with Dr. Joseph cardiology on-call at [facility]. Dr. Joseph did recommend transfer directly to the emergency department at [facility]. Patient was updated on plan of care. She continues to rest comfortably and is stable at this vseta \"    Pt was started on heparin around 2011. Pt arrived with only one IV, patient states labs were drawn off the same line that the continuous heparin was running in which could be a contributing factor to the heparin being paused for an hour during titration.   Per Dr. Tucker, resume continuous heparin.  Pt states she smoke between 1.5-2 packs of  cigarettes a day.   Pt is complaining of chest pressure, denies other needs. New IV line established, labs sent, Pt place on full cardiac monitor, call light within reach.

## 2025-02-27 NOTE — H&P
[facility]  Office: [phone]  Geovanni Madrigal DO, Ollie Marmolejo DO, Eligio Guzmán DO, Bjorn Diaz DO, Minerva Dinero MD, Yamila Pagan MD, Ade Narvaez MD, Britney Gallegos MD,  Sebastien Avendaño MD, Gaby Kilpatrick MD, Ariane Kinsey MD,  Ashley Ordaz DO, Kristel Cano MD, Jimbo Diallo MD, Chevy Madrigal DO, uSzanne Villafuerte MD,  Hamzah Vizcaino DO, Michaela Verma MD, Jeanne Davidson MD, Annika Shine MD, Ulises Rossi MD,  Willian Solomon MD, Lorraine Madsen MD, Misty Bianchi MD, Ronald Flores MD, Mitul Eller MD, Yumiko Cotto MD, Sha Cotto DO, Carlos Jose MD, Ashley Anthony MD, Mohsin Reza, MD, Shirley Waterhouse, CNP,  Lacy Chaparro CNP, Sha Pizarro, CNP,  Melinda Dwyer, CUATE, Maribel Plasencia, CNP, Marisol Tai, CNP, Estrellita Mendoza, CNP, Lorrie Berrios CNP, JUNIOR Lara-MEGHAN, Delores Polk, CNP, Gui Cordova, CNP,  Rolnada Hernandez, CNP, Rupinder Genao, CNP, Geetha Sotomayor, CNP,  Michelle Hobbs, CNS, Kristin Alba CNP, Elizabeth Cuevas CNP,   Lamar Moeller, CNP         [facility]   IN-PATIENT SERVICE   [facility]    HISTORY AND PHYSICAL EXAMINATION            Date:   2/27/2025  Patient name:  Vaishali Mccarty  Date of admission:  2/27/2025 10:16 AM  MRN:   [id number]  Account:  [id number]  YOB: 1980  PCP:    Megan Ferraro APRN - NP  Room:   01/01  Code Status:    Full Code    Chief Complaint:     Chief Complaint   Patient presents with    Chest Pain     [place] tx        History Obtained From:     Patient and medical records     History of Present Illness:     Vaishali Mccarty is a 44 y.o. Non- / non  female pmh hypertension, obesity, ongoing tobacco abuse , bicuspid aortic valve history of thoracic aortic aneurysm repair who presented to [place] ER with yesterday  with complaints of midsternal nonradiating chest pain associated with nausea.  She reports significant sharp chest pain with some component  <0.10 IU/L   Troponin    Collection Time: 02/27/25 11:34 AM   Result Value Ref Range    Troponin, High Sensitivity 912 (HH) 0 - 14 ng/L       Imaging/Diagnostics:  XR CHEST PORTABLE    Result Date: 2/27/2025  No acute cardiopulmonary process.     XR CHEST PORTABLE    Result Date: 2/26/2025  Mild CHF.       Assessment :      Hospital Problems             Last Modified POA    * (Principal) NSTEMI (non-ST elevated myocardial infarction) (HCC) 2/27/2025 Yes    Hypertension (Chronic) 2/27/2025 Yes    Tobacco abuse (Chronic) 2/27/2025 Yes    Hx of thoracic aortic aneurysm repair 2/27/2025 Yes    Bicuspid aortic valve 2/27/2025 Yes       Plan:     Patient status inpatient in the Progressive Unit/Step down    NSTEMI-heparin drip per ACS protocol.  Aspirin daily.  High intensity statin.  N.p.o. for tentative cardiac cath today.  Cardiology consult for management of non-ST elevation MI   Avoid nitro if possible given concerns for inferior MI.   Was given nitroglycerin but blood pressure dropped significantly.  Will give gentle hydration for preload while awaits cardiac cath    Ongoing tobacco use- cessation encouraged.  Will hold off on Nitropatch given acute coronary send    History of bicuspid aortic valve-noted    Status post thoracic aortic aneurysm repair    History of hypertension--> has been off metoprolol and chlorthalidone due to lack of insurance    Obesity-lifestyle modification    History of hyperlipidemia-resume high intensity statin    Consultations:   IP CONSULT TO CARDIOLOGY  IP CONSULT TO HOSPITALIST     Patient is admitted as inpatient status because of co-morbidities listed above, severity of signs and symptoms as outlined, requirement for current medical therapies and most importantly because of direct risk to patient if care not provided in a hospital setting.  Expected length of stay > 48 hours.    CORINNA Meehan NP  2/27/2025  12:28 PM    Copy sent to Megan Hand APRN - NP

## 2025-02-27 NOTE — ED NOTES
ED to inpatient nurses report      Chief Complaint:  Chief Complaint   Patient presents with    Chest Pain     Yajaira lowe      Present to ED from: [facility]     MOA:     LOC: alert and orientated to name, place, date  Mobility: Requires assistance * 1  Oxygen Baseline: RA    Current needs required: RA   Pending ED orders:  admission   Present condition: sent to the ED as an emergent transfer for cath, unsure if that is still the plan      Why did the patient come to the ED? Pt presents to the ED as an emergent transfer from [facility] for elevated troponin.   Pt states she started having chest pain two days ago and states she felt heavy pressure in her chest.   Pt states she stayed at home to see if it improved and when it didn't, patient went to the ED around 1800 the next day.   Pt is alert and oriented x4, initial troponin were  in the 30's, that started to double.   Per [place] note:   \"NSTEMI awaiting of bed assignment at [facility] patient has been accepted by the hospitalist service there. Repeat labs show continued increase in troponin most recent troponin is 864. Patient is currently on a heparin drip. States she has only minimal chest tightness at this time. Still awaiting a bed and [facility] is still discharge dependent. Spoke with Dr. Medeiros in the emergency department at [facility] who requested that I speak with cardiology and if the plan is to perform a heart catheterization today they would be able to separate as an ER to ER transfer. I did speak with Dr. Joseph cardiology on-call at [facility]. Dr. Joseph did recommend transfer directly to the emergency department at [facility]. Patient was updated on plan of care. She continues to rest comfortably and is stable at this vesta \"    Pt was started on heparin around 2011. Pt arrived with only one IV, patient states labs were drawn off the same line that the continuous heparin was running in which could be a contributing factor to the

## 2025-02-27 NOTE — PROCEDURES
[facility]        Date:   2/27/2025  Patient name: Vaishali Mccarty  Date of admission:  2/27/2025 10:16 AM  MRN:   [id number]  YOB: 1980    CARDIAC CATHETERIZATION    Operators:  Primary: Isaac Koch MD.  Assistant:     Indications for cath: NSTEMI    Procedure performed: Cardiac cath.    Access: Right Femoral artery      Procedure: After informed consent was obtained with explanation of the risks and benefits, patient was brought to the cath lab. The right groin were prepped and draped in sterile fashion. 1% lidocaine was used for local block. The Femoral artery was cannulated using ultrasound guidance with 6  Fr sheath with brisk arterial blood return. The side port was frequently flushed and aspirated with normal saline.    EBL is 15 mL    Dominance is right    Findings:    Left main: Normal with 0% stenosis    LAD: Diffuse luminal irregularities of 30 to 40% with 80% mid stenosis    LCX: Diffuse luminal irregularities of 30 to 40%    RCA: 50% proximal stenosis with 100% subacute occlusion mid RCA, length is 22 mm, GUSTAVO 0 flow, underwent PCI with CARO using 3.5 x 26 mm Daniel stent with 0% residual stenosis and restoration of GUSTAVO-3 flow.    The LV gram was not performed    Conclusions:  Subacute occlusion of mid RCA  Successful PCI with CARO to mid RCA with restoration of GUSTAVO-3 flow  Residual high-grade 80% mid LAD stenosis.  Otherwise nonobstructive disease  Overall preserved LV function    Recommendation:  Routine post PCI orders  Medical treatments.  Risk factors modifications.  Staged PCI to mid LAD in 4 to 6 weeks        Electronically signed by Isaac Koch MD on 2/27/2025 at 3:44 PM      [facility]  [phone]

## 2025-02-27 NOTE — PROGRESS NOTES
Received post cardiac cath procedure to PCC room 10. Assessment obtained. Restrictions reviewed with patient. Post procedure pathway initiated.  Right groin site soft , dressing dry and intact.  No hematoma noted.  Family at side.  Patient without complaints. Head of bed flat with right leg straight.

## 2025-02-27 NOTE — ED NOTES
Writer perfect served admitting team regarding this pt med orders for aspirin and metoprolol. Per Admitting team and Cardiology it was okay to give the meds now. Allenr asked admitting team to then change the order to be given now and not 2100, and asked since this pt HR is in the 60s is it still wanting to be given? Admitting team said to give. Allenr then spoke with Laura, pharmacy as the timing was not changed. Laura and  even noted the pt heart rate being in the 50s. Per the parameters on the original orders, the med is to be help is HR is <60. Laura had reccommended to not even mess with the order since the med could not be given a this time.

## 2025-02-27 NOTE — ED NOTES
Called [facility], asked to connected with [facility] ER as labs have changed. Connected to Dr Medeiros at [facility] and call was connected to Dr Rowell

## 2025-02-27 NOTE — ED PROVIDER NOTES
EMERGENCY DEPARTMENT ENCOUNTER   ATTENDING ATTESTATION     Pt Name: Vaishali Mccarty  MRN: [id number]  Birthdate 1980  Date of evaluation: 2/27/25   Vaishali Mccarty is a 44 y.o. female with CC: Chest Pain (Pt. Reports chest pain starting last night and into today. Pt. States mid chest pain with no radiation. Pt. Reports taking tums earlier today with x1 episode of emesis after with no relief)    MDM:   I performed a substantive part of the MDM during the patient's E/M visit. I personally evaluated and examined the patient. I personally made or approved the documented management plan and acknowledge its risk of complications.      ED Course as of 02/27/25 0052 Wed Feb 26, 2025 2017 Echo June 2025 EF 60% [TC]   2018 EKG: repeat EKG  This EKG is signed by emergency department physician.    Rate: 57  Qtc: 447ms  Rhythm: Sinus  Interpretation: sinus eknji, T wave inversions on septal, infero-lateral leads, no ST elevation  Comparison: stable as compared to patient's most recent EKG      [TC]   2022 I speak with Breana Mcintyre NP, who request that cardiology be paged.  Will except the patient in transfer to [facility] under Dr. Dinero. [JS]      ED Course User Index  [JS] Rashawn Oconnell II, PA-C  [TC] Dahiana Jain MD Thais Cintra, MD  Attending Emergency Physician        Dahiana Jain MD  02/27/25 0052

## 2025-02-27 NOTE — ED PROVIDER NOTES
[facility] ED  Emergency Department Encounter  Emergency Medicine Resident     Pt Name:Vaishali Mccarty  MRN: [id number]  Birthdate 1980  Date of evaluation: 2/27/25  PCP:  Megan Ferraro APRN - NP  Note Started: 10:25 AM EST      CHIEF COMPLAINT       Chest pain  - Ongoing since yesterday  - Transfer from [facility] [NSTEMI]    HISTORY OF PRESENT ILLNESS  (Location/Symptom, Timing/Onset, Context/Setting, Quality, Duration, Modifying Factors, Severity.)      Vaishali Mccarty is a 44 y.o. female who presents with chest pain with known NSTEMI.  Patient arrives as a transfer from [facility].  She states that she initially presented there after having chest pain which started on the evening of 2/25.  Patient states that it felt like very bad heartburn and thought it would subside.  She states that the pain had been ongoing yesterday and therefore she presented to [facility].  While there found to have markedly elevated troponins above her baseline, nonspecific ECG changes.  Patient was started on heparin infusion, given 4 ASA.  Patient requiring catheterization and transferred here for catheterization.    She denies anticoagulation, denies current medications.  States that she is meant to be on antihypertensives for history of hypertension, statin, diuretic however has not been on this secondary to insurance lapse.    She denies allergies, denies ongoing renal disease.    She does also have a history of previous thoracic aortic aneurysm for which she states she had surgery 8 years ago.    PAST MEDICAL / SURGICAL / SOCIAL / FAMILY HISTORY      has a past medical history of COVID-19, GERD (gastroesophageal reflux disease), Hypertension, and Thoracic aortic aneurysm.     has a past surgical history that includes Soft Tissue Biopsy; Thoracic aortic aneurysm repair; and Cardiac surgery.      Social History     Socioeconomic History    Marital status: Single     Spouse name: Not on file

## 2025-02-28 ENCOUNTER — APPOINTMENT (OUTPATIENT)
Dept: CT IMAGING | Age: 45
DRG: 322 | End: 2025-02-28

## 2025-02-28 ENCOUNTER — APPOINTMENT (OUTPATIENT)
Age: 45
DRG: 322 | End: 2025-02-28
Attending: INTERNAL MEDICINE

## 2025-02-28 VITALS
RESPIRATION RATE: 14 BRPM | TEMPERATURE: 97.5 F | DIASTOLIC BLOOD PRESSURE: 74 MMHG | WEIGHT: 224.65 LBS | OXYGEN SATURATION: 94 % | HEART RATE: 72 BPM | SYSTOLIC BLOOD PRESSURE: 105 MMHG | BODY MASS INDEX: 38.35 KG/M2 | HEIGHT: 64 IN

## 2025-02-28 LAB
ANION GAP SERPL CALCULATED.3IONS-SCNC: 11 MMOL/L (ref 9–16)
BASOPHILS # BLD: <0.03 K/UL (ref 0–0.2)
BASOPHILS NFR BLD: 0 % (ref 0–2)
BUN SERPL-MCNC: 11 MG/DL (ref 6–20)
CALCIUM SERPL-MCNC: 8.3 MG/DL (ref 8.6–10.4)
CHLORIDE SERPL-SCNC: 105 MMOL/L (ref 98–107)
CHOLEST SERPL-MCNC: 136 MG/DL (ref 0–199)
CHOLESTEROL/HDL RATIO: 4
CO2 SERPL-SCNC: 22 MMOL/L (ref 20–31)
CREAT SERPL-MCNC: 0.8 MG/DL (ref 0.6–0.9)
EKG ATRIAL RATE: 62 BPM
EKG P AXIS: 27 DEGREES
EKG P-R INTERVAL: 156 MS
EKG Q-T INTERVAL: 474 MS
EKG QRS DURATION: 84 MS
EKG QTC CALCULATION (BAZETT): 481 MS
EKG R AXIS: 32 DEGREES
EKG T AXIS: 91 DEGREES
EKG VENTRICULAR RATE: 62 BPM
EOSINOPHIL # BLD: <0.03 K/UL (ref 0–0.44)
EOSINOPHILS RELATIVE PERCENT: 0 % (ref 1–4)
ERYTHROCYTE [DISTWIDTH] IN BLOOD BY AUTOMATED COUNT: 13.6 % (ref 11.8–14.4)
EST. AVERAGE GLUCOSE BLD GHB EST-MCNC: 105 MG/DL
GFR, ESTIMATED: >90 ML/MIN/1.73M2
GLUCOSE SERPL-MCNC: 96 MG/DL (ref 74–99)
HBA1C MFR BLD: 5.3 % (ref 4–6)
HCT VFR BLD AUTO: 36.7 % (ref 36.3–47.1)
HDLC SERPL-MCNC: 34 MG/DL
HGB BLD-MCNC: 12.2 G/DL (ref 11.9–15.1)
IMM GRANULOCYTES # BLD AUTO: 0.03 K/UL (ref 0–0.3)
IMM GRANULOCYTES NFR BLD: 0 %
LDLC SERPL CALC-MCNC: 82 MG/DL (ref 0–100)
LYMPHOCYTES NFR BLD: 1.94 K/UL (ref 1.1–3.7)
LYMPHOCYTES RELATIVE PERCENT: 23 % (ref 24–43)
MAGNESIUM SERPL-MCNC: 2 MG/DL (ref 1.6–2.6)
MCH RBC QN AUTO: 28.8 PG (ref 25.2–33.5)
MCHC RBC AUTO-ENTMCNC: 33.2 G/DL (ref 28.4–34.8)
MCV RBC AUTO: 86.6 FL (ref 82.6–102.9)
MONOCYTES NFR BLD: 0.79 K/UL (ref 0.1–1.2)
MONOCYTES NFR BLD: 9 % (ref 3–12)
NEUTROPHILS NFR BLD: 68 % (ref 36–65)
NEUTS SEG NFR BLD: 5.59 K/UL (ref 1.5–8.1)
NRBC BLD-RTO: 0 PER 100 WBC
PLATELET # BLD AUTO: 183 K/UL (ref 138–453)
PMV BLD AUTO: 10.7 FL (ref 8.1–13.5)
POTASSIUM SERPL-SCNC: 3.5 MMOL/L (ref 3.7–5.3)
RBC # BLD AUTO: 4.24 M/UL (ref 3.95–5.11)
SODIUM SERPL-SCNC: 138 MMOL/L (ref 136–145)
TRIGL SERPL-MCNC: 101 MG/DL
TROPONIN I SERPL HS-MCNC: 1922 NG/L (ref 0–14)
VLDLC SERPL CALC-MCNC: 20 MG/DL (ref 1–30)
WBC OTHER # BLD: 8.4 K/UL (ref 3.5–11.3)

## 2025-02-28 PROCEDURE — 71275 CT ANGIOGRAPHY CHEST: CPT

## 2025-02-28 PROCEDURE — 80048 BASIC METABOLIC PNL TOTAL CA: CPT

## 2025-02-28 PROCEDURE — 2500000003 HC RX 250 WO HCPCS: Performed by: NURSE PRACTITIONER

## 2025-02-28 PROCEDURE — 93010 ELECTROCARDIOGRAM REPORT: CPT | Performed by: INTERNAL MEDICINE

## 2025-02-28 PROCEDURE — 99239 HOSP IP/OBS DSCHRG MGMT >30: CPT | Performed by: STUDENT IN AN ORGANIZED HEALTH CARE EDUCATION/TRAINING PROGRAM

## 2025-02-28 PROCEDURE — 83036 HEMOGLOBIN GLYCOSYLATED A1C: CPT

## 2025-02-28 PROCEDURE — 36415 COLL VENOUS BLD VENIPUNCTURE: CPT

## 2025-02-28 PROCEDURE — 83735 ASSAY OF MAGNESIUM: CPT

## 2025-02-28 PROCEDURE — 93306 TTE W/DOPPLER COMPLETE: CPT

## 2025-02-28 PROCEDURE — 80061 LIPID PANEL: CPT

## 2025-02-28 PROCEDURE — 85025 COMPLETE CBC W/AUTO DIFF WBC: CPT

## 2025-02-28 PROCEDURE — 84484 ASSAY OF TROPONIN QUANT: CPT

## 2025-02-28 PROCEDURE — 6360000002 HC RX W HCPCS: Performed by: STUDENT IN AN ORGANIZED HEALTH CARE EDUCATION/TRAINING PROGRAM

## 2025-02-28 PROCEDURE — 6370000000 HC RX 637 (ALT 250 FOR IP): Performed by: NURSE PRACTITIONER

## 2025-02-28 PROCEDURE — 6370000000 HC RX 637 (ALT 250 FOR IP): Performed by: STUDENT IN AN ORGANIZED HEALTH CARE EDUCATION/TRAINING PROGRAM

## 2025-02-28 PROCEDURE — 6360000004 HC RX CONTRAST MEDICATION: Performed by: INTERNAL MEDICINE

## 2025-02-28 RX ORDER — NITROGLYCERIN 0.4 MG/1
0.4 TABLET SUBLINGUAL EVERY 5 MIN PRN
Qty: 25 TABLET | Refills: 3 | Status: SHIPPED | OUTPATIENT
Start: 2025-02-28

## 2025-02-28 RX ORDER — CLOPIDOGREL BISULFATE 75 MG/1
75 TABLET ORAL DAILY
Qty: 90 TABLET | Refills: 0 | Status: SHIPPED | OUTPATIENT
Start: 2025-02-28

## 2025-02-28 RX ORDER — ATORVASTATIN CALCIUM 80 MG/1
80 TABLET, FILM COATED ORAL NIGHTLY
Qty: 30 TABLET | Refills: 3 | Status: SHIPPED | OUTPATIENT
Start: 2025-02-28

## 2025-02-28 RX ORDER — ASPIRIN 81 MG/1
81 TABLET ORAL DAILY
Qty: 30 TABLET | Refills: 3 | Status: SHIPPED | OUTPATIENT
Start: 2025-03-01

## 2025-02-28 RX ORDER — METOPROLOL TARTRATE 25 MG/1
12.5 TABLET, FILM COATED ORAL 2 TIMES DAILY
Qty: 60 TABLET | Refills: 3 | Status: SHIPPED | OUTPATIENT
Start: 2025-02-28

## 2025-02-28 RX ORDER — IOPAMIDOL 755 MG/ML
75 INJECTION, SOLUTION INTRAVASCULAR
Status: COMPLETED | OUTPATIENT
Start: 2025-02-28 | End: 2025-02-28

## 2025-02-28 RX ADMIN — IOPAMIDOL 75 ML: 755 INJECTION, SOLUTION INTRAVENOUS at 00:19

## 2025-02-28 RX ADMIN — METOPROLOL TARTRATE 12.5 MG: 25 TABLET, FILM COATED ORAL at 09:36

## 2025-02-28 RX ADMIN — ASPIRIN 81 MG: 81 TABLET, COATED ORAL at 09:36

## 2025-02-28 RX ADMIN — ENOXAPARIN SODIUM 40 MG: 100 INJECTION SUBCUTANEOUS at 09:36

## 2025-02-28 RX ADMIN — SODIUM CHLORIDE, PRESERVATIVE FREE 10 ML: 5 INJECTION INTRAVENOUS at 09:37

## 2025-02-28 RX ADMIN — TICAGRELOR 90 MG: 90 TABLET ORAL at 09:36

## 2025-02-28 RX ADMIN — POTASSIUM CHLORIDE 40 MEQ: 1500 TABLET, EXTENDED RELEASE ORAL at 05:08

## 2025-02-28 NOTE — CONSENT
Asked by Dr. Bo to evaluate pt for [employer] protocol 302 trial. Pt met inclusion/exclusion criteria for genetic screening. Pt and family approached at  __12:30 on 2/28/2025___. Pt read study consent. Questions answered. Pt wishes to participate in genetic screening. Consent voluntarily signed at ___13:20 on 2/28/25_. A copy of signed consent was given to patient. Lab work obtained as per protocol.

## 2025-02-28 NOTE — PLAN OF CARE
Problem: Chronic Conditions and Co-morbidities  Goal: Patient's chronic conditions and co-morbidity symptoms are monitored and maintained or improved  Outcome: Progressing     Problem: Discharge Planning  Goal: Discharge to home or other facility with appropriate resources  Outcome: Progressing  Flowsheets (Taken 2/27/2025 1900)  Discharge to home or other facility with appropriate resources:   Identify barriers to discharge with patient and caregiver   Arrange for needed discharge resources and transportation as appropriate   Identify discharge learning needs (meds, wound care, etc)   Refer to discharge planning if patient needs post-hospital services based on physician order or complex needs related to functional status, cognitive ability or social support system

## 2025-02-28 NOTE — CARE COORDINATION
Case Management Assessment  Initial Evaluation    Date/Time of Evaluation: 2/28/2025 11:30AM  Assessment Completed by: Rupinder Ortega RN    If patient is discharged prior to next notation, then this note serves as note for discharge by case management.    Patient Name: Vaishali Mccarty                   YOB: 1980  Diagnosis: Angina pectoris [I20.9]  Acute coronary syndrome (HCC) [I24.9]  NSTEMI (non-ST elevated myocardial infarction) (HCC) [I21.4]                   Date / Time: 2/27/2025 10:16 AM    Patient Admission Status: Inpatient   Readmission Risk (Low < 19, Mod (19-27), High > 27): Readmission Risk Score: 8.6    Current PCP: Megan Ferraro, CORINNA - NP  PCP verified by CM? (P) Yes (Megan Ferraro)    Chart Reviewed: Yes      History Provided by: (P) Patient  Patient Orientation: (P) Alert and Oriented, Person, Place, Situation    Patient Cognition: (P) Alert    Hospitalization in the last 30 days (Readmission):  No    If yes, Readmission Assessment in  Navigator will be completed.    Advance Directives:      Code Status: Full Code   Patient's Primary Decision Maker is: (P) Legal Next of Kin    Primary Decision Maker: Kathrin Jimenez - Brother/Sister - [phone]    Secondary Decision Maker: Linda Salazar - Brother/Sister - [phone]    Discharge Planning:    Patient lives with: (P) Children, Family Members Type of Home: (P) Apartment  Primary Care Giver: (P) Self  Patient Support Systems include: (P) Spouse/Significant Other, Children, Family Members   Current Financial resources:    Current community resources:    Current services prior to admission: (P) None            Current DME:              Type of Home Care services:  (P) None    ADLS  Prior functional level: (P) Independent in ADLs/IADLs  Current functional level: (P) Independent in ADLs/IADLs    PT AM-PAC:   /24  OT AM-PAC:   /24    Family can provide assistance at DC: (P) Yes  Would you like Case Management to discuss the discharge

## 2025-02-28 NOTE — PLAN OF CARE
Problem: Discharge Planning  Goal: Discharge to home or other facility with appropriate resources  2/28/2025 1142 by Bessie Beck, RN  Outcome: Progressing  2/28/2025 0250 by Katherine Ac, RN  Outcome: Progressing  Flowsheets (Taken 2/27/2025 1900)  Discharge to home or other facility with appropriate resources:   Identify barriers to discharge with patient and caregiver   Arrange for needed discharge resources and transportation as appropriate   Identify discharge learning needs (meds, wound care, etc)   Refer to discharge planning if patient needs post-hospital services based on physician order or complex needs related to functional status, cognitive ability or social support system     Problem: Chronic Conditions and Co-morbidities  Goal: Patient's chronic conditions and co-morbidity symptoms are monitored and maintained or improved  2/28/2025 1142 by Bessie Beck, RN  Outcome: Progressing  2/28/2025 0250 by Katherine Ac, RN  Outcome: Progressing

## 2025-02-28 NOTE — DISCHARGE SUMMARY
[facility]  Office: [phone]  Geovanni Madrigal DO, Ollie Marmolejo DO, Eligio Guzmán DO, Bjorn Diaz DO, Minerva Dinero MD, Yamila Pagan MD, Ade Narvaez MD, Britney Gallegos MD,  Sebastien Avendaño MD, Gaby Kilpatrick MD, Ariane Kinsey MD,  Ashley Ordaz DO, Kristel Cano MD, Jimbo Diallo MD, Chevy Madrigal DO, Suzanne Villafuerte MD,  Hamzah Vizcaino DO, Michaela Verma MD, Jeanne Davidson MD, Annika Shine MD, Ulises Rossi MD,  Willian Solomon MD, Lorraine Madsen MD, Misty Bianchi MD, Ronald Flores MD, Mitul Eller MD, Yumiko Cotto MD, Sha Cotto DO, Carlos Jose MD, Ashley Anthony MD, Mohsin Reza, MD, Shirley Waterhouse, CNP,  Lacy Chaparro CNP, Sha Pizarro, CNP,  Melinda Dwyer, CUATE, Maribel Plasencia, CNP, Marisol Tai, CNP, Estrellita Mendoza, CNP, Lorrie Berrios CNP, JUNIOR Lara-C, Delores Polk, CNP, Gui Cordova, CNP,  Rolanda Hernandez, CNP, Rupinder Genao, CNP, Geetha Sotomayor, CNP,  Michelle Hobbs, CNS, Kristin Alba CNP, Elizabeth Cuevas CNP,   Lamar Moeller, CNP         [facility]   IN-PATIENT SERVICE   [facility]    Discharge Summary     Patient ID: Vaishali Mccarty  :  1980   MRN: [id number]     ACCOUNT:  [id number]   Patient's PCP: Megan Ferraro APRN - NP  Admit Date: 2025   Discharge Date: 2025     Length of Stay: 1  Code Status:  Full Code  Admitting Physician: No admitting provider for patient encounter.  Discharge Physician: Ritesh Gil MD     Active Discharge Diagnoses:     Hospital Problem Lists:  Principal Problem:    NSTEMI (non-ST elevated myocardial infarction) (HCC)  Active Problems:    Acute coronary syndrome (HCC)    Hypertension    Tobacco abuse    Hx of thoracic aortic aneurysm repair    Bicuspid aortic valve  Resolved Problems:    * No resolved hospital problems. *      Admission Condition:  fair     Discharged Condition: fair    Hospital Stay:     Hospital Course:  Vaishali Mccarty is  these medications      EXCEDRIN MIGRAINE PO     ibuprofen 200 MG tablet  Commonly known as: ADVIL;MOTRIN     metoprolol succinate 50 MG extended release tablet  Commonly known as: TOPROL XL     tiZANidine 4 MG tablet  Commonly known as: ZANAFLEX            ASK your doctor about these medications      albuterol sulfate  (90 Base) MCG/ACT inhaler  Commonly known as: ProAir HFA  Inhale 2 puffs into the lungs every 6 hours as needed for Wheezing               Where to Get Your Medications        These medications were sent to [address] -  [phone] - F [phone]  [facility] [facility] [address]      Phone: [phone]   aspirin 81 MG EC tablet  atorvastatin 80 MG tablet  metoprolol tartrate 25 MG tablet  nitroGLYCERIN 0.4 MG SL tablet  ticagrelor 90 MG Tabs tablet         Discharge Procedure Orders   [facility]   Referral Priority: Routine Referral Type: Eval and Treat   Referral Reason: Specialty Services Required   Requested Specialty: Cardiac Rehabilitation   Number of Visits Requested: 1       Time Spent on discharge is  34 mins in patient examination, evaluation, counseling as well as medication reconciliation, prescriptions for required medications, discharge plan and follow up.    Electronically signed by   Ritesh Gil MD  2/28/2025  12:38 PM      Thank you Megan Hand, APRN - NP for the opportunity to be involved in this patient's care.

## 2025-02-28 NOTE — PROGRESS NOTES
CLINICAL PHARMACY NOTE: MEDS TO BEDS    Total # of Prescriptions Filled: 5   The following medications were delivered to the patient:  ASA 81MG   LIPITOR 80MG  METOPROLOL TART 25  NITRO   PLAVIX 75MG     Additional Documentation:

## 2025-02-28 NOTE — PROGRESS NOTES
[facility]   Progress Note                   Date:   2/28/2025  Patient name: Vaishali Mccarty  Date of admission:  2/27/2025 10:16 AM  MRN:   [id number]  YOB: 1980  PCP: Megan Ferraro, APRN - NP    Reason for Admission: Angina pectoris [I20.9]  Acute coronary syndrome (HCC) [I24.9]  NSTEMI (non-ST elevated myocardial infarction) (HCC) [I21.4]    Subjective:       Clinical Changes / Abnormalities: Pt seen and examined in the room.  Pt denies any CP or sob.  Labs, vitals and tele reviewed-  s/p cath. Groin soft.        Medications:   Scheduled Meds:   aspirin  81 mg Oral Daily    sodium chloride flush  5-40 mL IntraVENous 2 times per day    metoprolol tartrate  12.5 mg Oral BID    ticagrelor  90 mg Oral BID    enoxaparin  40 mg SubCUTAneous Daily    atorvastatin  80 mg Oral Nightly     Continuous Infusions:   sodium chloride       CBC:   Recent Labs     02/26/25  1829 02/27/25  1050 02/28/25  0311   WBC 10.5 9.6 8.4   HGB 15.2* 13.2 12.2    195 183     BMP:    Recent Labs     02/26/25  1829 02/27/25  1050 02/28/25  0311    138 138   K 3.5* 3.7 3.5*    104 105   CO2 28 24 22   BUN 9 9 11   CREATININE 0.8 0.8 0.8   GLUCOSE 109* 106* 96     Hepatic:   Recent Labs     02/27/25  1050   *   ALT 21   BILITOT 0.4   ALKPHOS 111*     Troponin:   Recent Labs     02/27/25  0830 02/27/25  1050 02/27/25  1134   TROPHS 964* 944* 912*     BNP: No results for input(s): \"BNP\" in the last 72 hours.  Lipids:   Recent Labs     02/28/25  0311   CHOL 136   HDL 34*     INR:   Recent Labs     02/26/25  1829 02/27/25  1050   INR 0.9 1.0     CARDIAC CATH 2/27/25  Findings:     Left main: Normal with 0% stenosis     LAD: Diffuse luminal irregularities of 30 to 40% with 80% mid stenosis     LCX: Diffuse luminal irregularities of 30 to 40%     RCA: 50% proximal stenosis with 100% subacute occlusion mid RCA, length is 22 mm, GUSTAVO 0 flow, underwent PCI with CARO using 3.5 x 26 mm Daniel  stent with 0% residual stenosis and restoration of GUSTAVO-3 flow.     The LV gram was not performed     Conclusions:  Subacute occlusion of mid RCA  Successful PCI with CARO to mid RCA with restoration of GUSTAVO-3 flow  Residual high-grade 80% mid LAD stenosis.  Otherwise nonobstructive disease  Overall preserved LV function     Recommendation:  Routine post PCI orders  Medical treatments.  Risk factors modifications.  Staged PCI to mid LAD in 4 to 6 weeks  Objective:   Vitals: /74   Pulse 72   Temp 98.3 °F (36.8 °C) (Oral)   Resp 13   Ht 1.626 m (5' 4\")   Wt 101.9 kg (224 lb 10.4 oz)   SpO2 94%   BMI 38.56 kg/m²   General appearance: alert and cooperative with exam  HEENT: Head: Normocephalic, no lesions, without obvious abnormality.  Neck: no JVD, trachea midline, no adenopathy  Lungs: Clear to auscultation  Heart: Regular rate and rhythm, s1/s2 auscultated, no murmurs  Abdomen: soft, non-tender, bowel sounds active  Extremities: no edema, groin soft.   Neurologic: not done        Assessment / Acute Cardiac Problems:   NSTEMI   HTN     Patient Active Problem List:     Hypertension     Tobacco abuse     Tobacco abuse counseling     COVID-19     Hx of thoracic aortic aneurysm repair     Throat pain in adult     Bicuspid aortic valve     NSTEMI (non-ST elevated myocardial infarction) (HCC)     Acute coronary syndrome (HCC)      Plan of Treatment:   NSTEMI.  S/p PCI.  Continue ASA, statin, BB, and brilnta.  Will order SL nitro PRN  Long discussion regarding medications and importance of brilinta. Will send to meds to beds to ensure it is covered and she has on discharge.   Pt needs staged PCI to LAD in 4-6 weeks   ECHO pending.    Smoking cessation.   If ECHO stable, ok to d./c and follow up as outpt in 2 weeks.  Office will call to schedule PCI     Electronically signed by CORINNA LOZOYA CNP on 2/28/2025 at 10:08 AM  [facility]  [phone]

## 2025-03-01 LAB
ECHO AO ASC DIAM: 3.2 CM
ECHO AO ASCENDING AORTA INDEX: 1.55 CM/M2
ECHO AO ROOT DIAM: 4 CM
ECHO AO ROOT INDEX: 1.94 CM/M2
ECHO AR MAX VEL PISA: 3.1 M/S
ECHO AV AREA PEAK VELOCITY: 2.4 CM2
ECHO AV AREA VTI: 2.4 CM2
ECHO AV AREA/BSA PEAK VELOCITY: 1.2 CM2/M2
ECHO AV AREA/BSA VTI: 1.2 CM2/M2
ECHO AV MEAN GRADIENT: 11 MMHG
ECHO AV MEAN VELOCITY: 1.6 M/S
ECHO AV PEAK GRADIENT: 20 MMHG
ECHO AV PEAK VELOCITY: 2.2 M/S
ECHO AV REGURGITANT PHT: 602 MS
ECHO AV VELOCITY RATIO: 0.45
ECHO AV VTI: 49.1 CM
ECHO BSA: 2.14 M2
ECHO IVC PROX: 1.8 CM
ECHO LA AREA 2C: 16.4 CM2
ECHO LA AREA 4C: 15.4 CM2
ECHO LA DIAMETER INDEX: 1.46 CM/M2
ECHO LA DIAMETER: 3 CM
ECHO LA MAJOR AXIS: 4.9 CM
ECHO LA MINOR AXIS: 5.3 CM
ECHO LA TO AORTIC ROOT RATIO: 0.75
ECHO LA VOL BP: 42 ML (ref 22–52)
ECHO LA VOL MOD A2C: 42 ML (ref 22–52)
ECHO LA VOL MOD A4C: 39 ML (ref 22–52)
ECHO LA VOL/BSA BIPLANE: 20 ML/M2 (ref 16–34)
ECHO LA VOLUME INDEX MOD A2C: 20 ML/M2 (ref 16–34)
ECHO LA VOLUME INDEX MOD A4C: 19 ML/M2 (ref 16–34)
ECHO LV E' LATERAL VELOCITY: 6.85 CM/S
ECHO LV E' SEPTAL VELOCITY: 5.55 CM/S
ECHO LV EDV A2C: 75 ML
ECHO LV EDV A4C: 98 ML
ECHO LV EDV INDEX A4C: 48 ML/M2
ECHO LV EDV NDEX A2C: 36 ML/M2
ECHO LV EF PHYSICIAN: 50 %
ECHO LV EJECTION FRACTION A2C: 48 %
ECHO LV EJECTION FRACTION A4C: 56 %
ECHO LV EJECTION FRACTION BIPLANE: 51 % (ref 55–100)
ECHO LV ESV A2C: 39 ML
ECHO LV ESV A4C: 43 ML
ECHO LV ESV INDEX A2C: 19 ML/M2
ECHO LV ESV INDEX A4C: 21 ML/M2
ECHO LV FRACTIONAL SHORTENING: 46 % (ref 28–44)
ECHO LV INTERNAL DIMENSION DIASTOLE INDEX: 2.62 CM/M2
ECHO LV INTERNAL DIMENSION DIASTOLIC: 5.4 CM (ref 3.9–5.3)
ECHO LV INTERNAL DIMENSION SYSTOLIC INDEX: 1.41 CM/M2
ECHO LV INTERNAL DIMENSION SYSTOLIC: 2.9 CM
ECHO LV IVSD: 1.2 CM (ref 0.6–0.9)
ECHO LV MASS 2D: 264.4 G (ref 67–162)
ECHO LV MASS INDEX 2D: 128.4 G/M2 (ref 43–95)
ECHO LV POSTERIOR WALL DIASTOLIC: 1.2 CM (ref 0.6–0.9)
ECHO LV RELATIVE WALL THICKNESS RATIO: 0.44
ECHO LVOT AREA: 5.3 CM2
ECHO LVOT AV VTI INDEX: 0.45
ECHO LVOT DIAM: 2.6 CM
ECHO LVOT MEAN GRADIENT: 2 MMHG
ECHO LVOT PEAK GRADIENT: 4 MMHG
ECHO LVOT PEAK VELOCITY: 1 M/S
ECHO LVOT STROKE VOLUME INDEX: 56.9 ML/M2
ECHO LVOT SV: 117.3 ML
ECHO LVOT VTI: 22.1 CM
ECHO MV A VELOCITY: 1.03 M/S
ECHO MV AREA VTI: 3.3 CM2
ECHO MV E DECELERATION TIME (DT): 213 MS
ECHO MV E VELOCITY: 0.96 M/S
ECHO MV E/A RATIO: 0.93
ECHO MV E/E' LATERAL: 14.01
ECHO MV E/E' RATIO (AVERAGED): 15.66
ECHO MV E/E' SEPTAL: 17.3
ECHO MV LVOT VTI INDEX: 1.62
ECHO MV MAX VELOCITY: 1.1 M/S
ECHO MV MEAN GRADIENT: 2 MMHG
ECHO MV MEAN VELOCITY: 0.7 M/S
ECHO MV PEAK GRADIENT: 5 MMHG
ECHO MV VTI: 35.9 CM
ECHO RV FREE WALL PEAK S': 11.1 CM/S
ECHO RV TAPSE: 2.4 CM (ref 1.7–?)
ECHO TV REGURGITANT MAX VELOCITY: 1.79 M/S
ECHO TV REGURGITANT PEAK GRADIENT: 13 MMHG

## 2025-03-02 LAB
EKG ATRIAL RATE: 55 BPM
EKG P AXIS: 52 DEGREES
EKG P-R INTERVAL: 168 MS
EKG Q-T INTERVAL: 472 MS
EKG QRS DURATION: 86 MS
EKG QTC CALCULATION (BAZETT): 451 MS
EKG R AXIS: 30 DEGREES
EKG T AXIS: 61 DEGREES
EKG VENTRICULAR RATE: 55 BPM

## 2025-03-02 PROCEDURE — 93010 ELECTROCARDIOGRAM REPORT: CPT | Performed by: INTERNAL MEDICINE

## 2025-03-06 NOTE — H&P (VIEW-ONLY)
PCI to LAD    LV function 50% with known Bicuspid Aortic valve on echo   HTN-  Stable. Continue current medication   S/p supra-coronary ascending aorta replacement (30-mm Gelweave graft) via median sternotomy with Dr. Ramos on 9/2/16.   Obesity - Chronic. Encouraged diet, exercise, and discussed weight loss extensively.  Tobacco abuse. Chronic. Discussed extensively and gave options to help with quitting.   Claudication-  will obtain vascular arterial duplex to lower extremity  Hyperlipidemia- Chronic. LDL 82 improved from prior . LDL goal < 70. Optimize therapy.    Follow up in [place] after cath     Thank you for allowing me to participate in the care of this patient, please do not hesitate to call if you have any question  CORINNA Grace - NP   [facility]  [facility]  [phone]

## 2025-04-03 ENCOUNTER — HOSPITAL ENCOUNTER (OUTPATIENT)
Age: 45
Setting detail: OUTPATIENT SURGERY
Discharge: HOME | End: 2025-04-03
Attending: INTERNAL MEDICINE | Admitting: INTERNAL MEDICINE

## 2025-04-03 VITALS
RESPIRATION RATE: 14 BRPM | HEART RATE: 68 BPM | SYSTOLIC BLOOD PRESSURE: 139 MMHG | BODY MASS INDEX: 38.93 KG/M2 | OXYGEN SATURATION: 99 % | HEIGHT: 64 IN | DIASTOLIC BLOOD PRESSURE: 80 MMHG | TEMPERATURE: 97.9 F | WEIGHT: 228 LBS

## 2025-04-03 DIAGNOSIS — I25.119 CORONARY ARTERY DISEASE WITH ANGINA PECTORIS, UNSPECIFIED VESSEL OR LESION TYPE, UNSPECIFIED WHETHER NATIVE OR TRANSPLANTED HEART: ICD-10-CM

## 2025-04-03 LAB
BUN BLD-MCNC: 19 MG/DL (ref 8–26)
CHLORIDE BLD-SCNC: 100 MMOL/L (ref 98–107)
ECHO BSA: 2.16 M2
EGFR, POC: 71 ML/MIN/1.73M2
GLUCOSE BLD-MCNC: 96 MG/DL (ref 74–100)
HCG, PREGNANCY URINE (POC): NEGATIVE
HCT VFR BLD AUTO: 49 % (ref 36–46)
PLATELET # BLD AUTO: 255 K/UL (ref 138–453)
POC CREATININE: 1 MG/DL (ref 0.51–1.19)
POC HEMOGLOBIN (CALC): 16.6 G/DL (ref 12–16)
POTASSIUM BLD-SCNC: 3.4 MMOL/L (ref 3.5–4.5)
SODIUM BLD-SCNC: 141 MMOL/L (ref 138–146)

## 2025-04-03 PROCEDURE — 2709999900 HC NON-CHARGEABLE SUPPLY: Performed by: INTERNAL MEDICINE

## 2025-04-03 PROCEDURE — 99153 MOD SED SAME PHYS/QHP EA: CPT | Performed by: INTERNAL MEDICINE

## 2025-04-03 PROCEDURE — C9600 PERC DRUG-EL COR STENT SING: HCPCS | Performed by: INTERNAL MEDICINE

## 2025-04-03 PROCEDURE — C1769 GUIDE WIRE: HCPCS | Performed by: INTERNAL MEDICINE

## 2025-04-03 PROCEDURE — 82947 ASSAY GLUCOSE BLOOD QUANT: CPT

## 2025-04-03 PROCEDURE — 7100000010 HC PHASE II RECOVERY - FIRST 15 MIN: Performed by: INTERNAL MEDICINE

## 2025-04-03 PROCEDURE — 82565 ASSAY OF CREATININE: CPT

## 2025-04-03 PROCEDURE — 7100000011 HC PHASE II RECOVERY - ADDTL 15 MIN: Performed by: INTERNAL MEDICINE

## 2025-04-03 PROCEDURE — 85049 AUTOMATED PLATELET COUNT: CPT

## 2025-04-03 PROCEDURE — 82435 ASSAY OF BLOOD CHLORIDE: CPT

## 2025-04-03 PROCEDURE — 84132 ASSAY OF SERUM POTASSIUM: CPT

## 2025-04-03 PROCEDURE — 84520 ASSAY OF UREA NITROGEN: CPT

## 2025-04-03 PROCEDURE — 99152 MOD SED SAME PHYS/QHP 5/>YRS: CPT | Performed by: INTERNAL MEDICINE

## 2025-04-03 PROCEDURE — C1725 CATH, TRANSLUMIN NON-LASER: HCPCS | Performed by: INTERNAL MEDICINE

## 2025-04-03 PROCEDURE — 85014 HEMATOCRIT: CPT

## 2025-04-03 PROCEDURE — C1894 INTRO/SHEATH, NON-LASER: HCPCS | Performed by: INTERNAL MEDICINE

## 2025-04-03 PROCEDURE — 84295 ASSAY OF SERUM SODIUM: CPT

## 2025-04-03 PROCEDURE — C1887 CATHETER, GUIDING: HCPCS | Performed by: INTERNAL MEDICINE

## 2025-04-03 PROCEDURE — C1874 STENT, COATED/COV W/DEL SYS: HCPCS | Performed by: INTERNAL MEDICINE

## 2025-04-03 PROCEDURE — 81025 URINE PREGNANCY TEST: CPT

## 2025-04-03 PROCEDURE — 76937 US GUIDE VASCULAR ACCESS: CPT | Performed by: INTERNAL MEDICINE

## 2025-04-03 PROCEDURE — 6360000002 HC RX W HCPCS: Performed by: INTERNAL MEDICINE

## 2025-04-03 PROCEDURE — C1760 CLOSURE DEV, VASC: HCPCS | Performed by: INTERNAL MEDICINE

## 2025-04-03 PROCEDURE — 2580000003 HC RX 258: Performed by: INTERNAL MEDICINE

## 2025-04-03 DEVICE — STENT ONYXNG30030UX ONYX 3.00X30RX
Type: IMPLANTABLE DEVICE | Status: FUNCTIONAL
Brand: ONYX FRONTIER™

## 2025-04-03 DEVICE — ANGIO-SEAL VIP VASCULAR CLOSURE DEVICE
Type: IMPLANTABLE DEVICE | Status: FUNCTIONAL
Brand: ANGIO-SEAL

## 2025-04-03 DEVICE — STENT ONYXNG27512UX ONYX 2.75X12RX
Type: IMPLANTABLE DEVICE | Status: FUNCTIONAL
Brand: ONYX FRONTIER™

## 2025-04-03 DEVICE — STENT ONYXNG35038UX ONYX 3.50X38RX
Type: IMPLANTABLE DEVICE | Status: FUNCTIONAL
Brand: ONYX FRONTIER™

## 2025-04-03 DEVICE — EVEROLIMUS-ELUTING PLATINUM CHROMIUM CORONARY STENT SYSTEM
Type: IMPLANTABLE DEVICE | Status: FUNCTIONAL
Brand: SYNERGY™ XD

## 2025-04-03 RX ORDER — SODIUM CHLORIDE 0.9 % (FLUSH) 0.9 %
5-40 SYRINGE (ML) INJECTION PRN
Status: CANCELLED | OUTPATIENT
Start: 2025-04-03

## 2025-04-03 RX ORDER — ACETAMINOPHEN 325 MG/1
650 TABLET ORAL EVERY 4 HOURS PRN
Status: CANCELLED | OUTPATIENT
Start: 2025-04-03

## 2025-04-03 RX ORDER — SODIUM CHLORIDE 9 MG/ML
INJECTION, SOLUTION INTRAVENOUS CONTINUOUS
Status: DISCONTINUED | OUTPATIENT
Start: 2025-04-03 | End: 2025-04-03 | Stop reason: HOSPADM

## 2025-04-03 RX ORDER — BIVALIRUDIN 250 MG/5ML
INJECTION, POWDER, LYOPHILIZED, FOR SOLUTION INTRAVENOUS PRN
Status: DISCONTINUED | OUTPATIENT
Start: 2025-04-03 | End: 2025-04-03 | Stop reason: HOSPADM

## 2025-04-03 RX ORDER — FENTANYL CITRATE 50 UG/ML
INJECTION, SOLUTION INTRAMUSCULAR; INTRAVENOUS PRN
Status: DISCONTINUED | OUTPATIENT
Start: 2025-04-03 | End: 2025-04-03 | Stop reason: HOSPADM

## 2025-04-03 RX ORDER — ATORVASTATIN CALCIUM 80 MG/1
80 TABLET, FILM COATED ORAL NIGHTLY
Qty: 90 TABLET | Refills: 3 | Status: SHIPPED | OUTPATIENT
Start: 2025-04-03

## 2025-04-03 RX ORDER — METOPROLOL TARTRATE 25 MG/1
12.5 TABLET, FILM COATED ORAL 2 TIMES DAILY
Qty: 90 TABLET | Refills: 3 | Status: SHIPPED | OUTPATIENT
Start: 2025-04-03

## 2025-04-03 RX ORDER — CLOPIDOGREL BISULFATE 75 MG/1
75 TABLET ORAL DAILY
Qty: 90 TABLET | Refills: 3 | Status: SHIPPED | OUTPATIENT
Start: 2025-04-03

## 2025-04-03 RX ORDER — NITROGLYCERIN 20 MG/100ML
INJECTION INTRAVENOUS PRN
Status: DISCONTINUED | OUTPATIENT
Start: 2025-04-03 | End: 2025-04-03 | Stop reason: HOSPADM

## 2025-04-03 RX ORDER — IOPAMIDOL 755 MG/ML
INJECTION, SOLUTION INTRAVASCULAR PRN
Status: DISCONTINUED | OUTPATIENT
Start: 2025-04-03 | End: 2025-04-03 | Stop reason: HOSPADM

## 2025-04-03 RX ORDER — SODIUM CHLORIDE 0.9 % (FLUSH) 0.9 %
5-40 SYRINGE (ML) INJECTION EVERY 12 HOURS SCHEDULED
Status: CANCELLED | OUTPATIENT
Start: 2025-04-03

## 2025-04-03 RX ORDER — ASPIRIN 81 MG/1
81 TABLET ORAL DAILY
Qty: 90 TABLET | Refills: 3 | Status: SHIPPED | OUTPATIENT
Start: 2025-04-03

## 2025-04-03 RX ORDER — MIDAZOLAM HYDROCHLORIDE 1 MG/ML
INJECTION, SOLUTION INTRAMUSCULAR; INTRAVENOUS PRN
Status: DISCONTINUED | OUTPATIENT
Start: 2025-04-03 | End: 2025-04-03 | Stop reason: HOSPADM

## 2025-04-03 RX ORDER — SODIUM CHLORIDE 9 MG/ML
INJECTION, SOLUTION INTRAVENOUS PRN
Status: CANCELLED | OUTPATIENT
Start: 2025-04-03

## 2025-04-03 RX ADMIN — SODIUM CHLORIDE: 0.9 INJECTION, SOLUTION INTRAVENOUS at 08:56

## 2025-04-03 NOTE — PROCEDURES
[facility]        Date:   4/3/2025  Patient name: Vaishali Mccarty  Date of admission:  4/3/2025  8:13 AM  MRN:   [id number]  YOB: 1980    CARDIAC CATHETERIZATION    Operators:  Primary: Isaac Koch MD.  Assistant:     Indications for cath: USA, high-grade LAD stenosis, recent RCA stent    Procedure performed: Cardiac cath.    Access: Right Femoral artery      Procedure: After informed consent was obtained with explanation of the risks and benefits, patient was brought to the cath lab. The right groin were prepped and draped in sterile fashion. 1% lidocaine was used for local block. The Femoral artery was cannulated using ultrasound guidance with 6  Fr sheath with brisk arterial blood return. The side port was frequently flushed and aspirated with normal saline.    EBL is 15 mL    Dominance is right    Findings:    LAD: 80% long mid stenosis, length is 44 mm, GUSTAVO-3 flow, underwent PCI with CARO using 2.5 x 48 mm Synergy stent with 0% residual stenosis and GUSTAVO-3 flow      RCA: Patent mid RCA stent, ostial and proximal RCA had 75% stenosis length is 34 mm, GUSTAVO-3 flow, underwent PCI with CARO using 3.5 x 38 mm Daniel stent with 0% residual stenosis and GUSTAVO-3 flow    Distal RCA post stent had 80% stenosis, length 40 mm, GUSTAVO-3 flow, underwent PCI with CARO using 3.0 x 30 mm Daniel stent and 2.75 x 12 mm Dubach stent with 0% residual stenosis and GUSTAVO-3 flow        Conclusions:  Patent prior mid RCA stent  Successful PCI with CARO to proximal and distal RCA  Successful PCI with CARO to mid LAD      Recommendation:  Routine post PCI orders  Medical treatments.  Risk factors modifications.        Electronically signed by Isaac Koch MD on 4/3/2025 at 11:15 AM      [facility]  [phone]

## 2025-04-03 NOTE — PROGRESS NOTES
Received post cardiac cath procedure to PCC room 5. Assessment obtained. Restrictions reviewed with patient. Post procedure pathway initiated.  Right site soft , Dressing dry and intact.  No hematoma noted.  Family at side.  Patient without complaints. Head of bed flat with right leg straight.

## 2025-04-03 NOTE — PROGRESS NOTES
Patient admitted, consent signed and questions answered. Patient ready for procedure. Call light to reach with side rails up 2 of 2. Right groin area clipped with writer and Martha RN present.  Family at bedside with patient.  History and physical complete.

## 2025-04-03 NOTE — INTERVAL H&P NOTE
Update History & Physical    The patient's History and Physical of March 11, 2025 was reviewed with the patient and I examined the patient. There was no change. The surgical site was confirmed by the patient and me.     Plan: The risks, benefits, expected outcome, and alternative to the recommended procedure have been discussed with the patient. Patient understands and wants to proceed with the procedure.     Electronically signed by Janelle Carcamo MD on 4/3/2025 at 8:49 AM

## 2025-04-03 NOTE — PROGRESS NOTES
All discharge instructions reviewed, questions answered. Pulses obtained & documented. Pt ambulatory. Site clean dry and intact. No bleeding, hematoma, or bruising noted. IV removed. Patient discharged with all belongings.

## 2025-06-11 RX ORDER — POLYETHYLENE GLYCOL 3350 17 G
POWDER IN PACKET (EA) ORAL
COMMUNITY
Start: 2025-04-14 | End: 2025-06-13

## 2025-06-11 RX ORDER — NICOTINE 21 MG/24HR
PATCH, TRANSDERMAL 24 HOURS TRANSDERMAL
COMMUNITY
Start: 2025-03-06 | End: 2025-06-13

## 2025-08-18 ENCOUNTER — OFFICE VISIT (OUTPATIENT)
Dept: OBGYN | Age: 45
End: 2025-08-18

## 2025-08-18 ENCOUNTER — HOSPITAL ENCOUNTER (OUTPATIENT)
Age: 45
Setting detail: SPECIMEN
Discharge: HOME | End: 2025-08-18
Payer: COMMERCIAL

## 2025-08-18 VITALS
SYSTOLIC BLOOD PRESSURE: 114 MMHG | WEIGHT: 242 LBS | HEIGHT: 64 IN | DIASTOLIC BLOOD PRESSURE: 74 MMHG | BODY MASS INDEX: 41.32 KG/M2

## 2025-08-18 DIAGNOSIS — R87.610 ASCUS WITH POSITIVE HIGH RISK HPV CERVICAL: ICD-10-CM

## 2025-08-18 DIAGNOSIS — R87.810 ASCUS WITH POSITIVE HIGH RISK HPV CERVICAL: ICD-10-CM

## 2025-08-18 DIAGNOSIS — R87.810 ASCUS WITH POSITIVE HIGH RISK HPV CERVICAL: Primary | ICD-10-CM

## 2025-08-18 DIAGNOSIS — R87.610 ASCUS WITH POSITIVE HIGH RISK HPV CERVICAL: Primary | ICD-10-CM

## 2025-08-18 DIAGNOSIS — Z01.812 PRE-PROCEDURE LAB EXAM: ICD-10-CM

## 2025-08-18 LAB
CONTROL: PRESENT
PREGNANCY TEST URINE, POC: NORMAL

## 2025-08-18 PROCEDURE — 88300 SURGICAL PATH GROSS: CPT

## 2025-08-18 PROCEDURE — 88305 TISSUE EXAM BY PATHOLOGIST: CPT

## 2025-08-18 PROCEDURE — 88342 IMHCHEM/IMCYTCHM 1ST ANTB: CPT

## 2025-08-18 ASSESSMENT — PATIENT HEALTH QUESTIONNAIRE - PHQ9
SUM OF ALL RESPONSES TO PHQ QUESTIONS 1-9: 0
2. FEELING DOWN, DEPRESSED OR HOPELESS: NOT AT ALL
SUM OF ALL RESPONSES TO PHQ QUESTIONS 1-9: 0
SUM OF ALL RESPONSES TO PHQ QUESTIONS 1-9: 0
1. LITTLE INTEREST OR PLEASURE IN DOING THINGS: NOT AT ALL
SUM OF ALL RESPONSES TO PHQ QUESTIONS 1-9: 0

## 2025-08-22 LAB — SURGICAL PATHOLOGY REPORT: NORMAL

## (undated) DEVICE — GUIDEWIRE 35/260/FC/PTFE/3J: Brand: GUIDEWIRE

## (undated) DEVICE — Device

## (undated) DEVICE — CATHETER ANGIO 6FR L100CM DIA0.056IN FR4 CRV VASC ACCS EXPO

## (undated) DEVICE — KIT MICRO INTRO 4FR STIFF 40CM NIGHTENALL TUNGSTEN 7SMT

## (undated) DEVICE — INTRODUCER SHTH 6FR L11CM 0.038IN STD SIDEPRT EXTN 3 W

## (undated) DEVICE — TRAY SURG CUST CRD CATH TOLEDO

## (undated) DEVICE — ANGIOGRAPHIC CATHETER: Brand: EXPO™

## (undated) DEVICE — CATHETER GUID ADRT 6FR 072IN 5 ECPC GRN

## (undated) DEVICE — GUIDEWIRE WITH ICE™ HYDROPHILIC COATING: Brand: LUGE™

## (undated) DEVICE — ANGIO-SEAL VIP VASCULAR CLOSURE DEVICE: Brand: ANGIO-SEAL

## (undated) DEVICE — CATH BLLN ANGIO 2.50X20MM SC EUPHORA RX

## (undated) DEVICE — CATHETER GUIDE 6FR L100CM GRN PTFE JR4 TRUELUMEN W/ 2 SIDE H

## (undated) DEVICE — CATH BLLN ANGIO 2X15MM SC EUPHORA RX

## (undated) DEVICE — INFLATION KIT CUST REV

## (undated) DEVICE — GLIDESHEATH SLENDER STAINLESS STEEL KIT: Brand: GLIDESHEATH SLENDER